# Patient Record
Sex: MALE | Race: WHITE | Employment: UNEMPLOYED | URBAN - METROPOLITAN AREA
[De-identification: names, ages, dates, MRNs, and addresses within clinical notes are randomized per-mention and may not be internally consistent; named-entity substitution may affect disease eponyms.]

---

## 2020-11-17 ENCOUNTER — HOSPITAL ENCOUNTER (OUTPATIENT)
Dept: CT IMAGING | Age: 70
Discharge: HOME | End: 2020-11-17
Payer: MEDICARE

## 2020-11-17 LAB
GFR AFRICAN AMERICAN: >60
GFR NON-AFRICAN AMERICAN: >60
PERFORMED ON: NORMAL
POC CREATININE: 1.1 MG/DL (ref 0.8–1.3)
POC SAMPLE TYPE: NORMAL

## 2020-11-17 PROCEDURE — 6360000004 HC RX CONTRAST MEDICATION: Performed by: NURSE PRACTITIONER

## 2020-11-17 PROCEDURE — 74178 CT ABD&PLV WO CNTR FLWD CNTR: CPT

## 2020-11-17 PROCEDURE — 82565 ASSAY OF CREATININE: CPT

## 2020-11-17 RX ADMIN — IOPAMIDOL 75 ML: 755 INJECTION, SOLUTION INTRAVENOUS at 08:33

## 2021-04-18 ENCOUNTER — HOSPITAL ENCOUNTER (EMERGENCY)
Age: 71
Discharge: HOME | End: 2021-04-18
Attending: EMERGENCY MEDICINE
Payer: MEDICARE

## 2021-04-18 ENCOUNTER — APPOINTMENT (OUTPATIENT)
Dept: CT IMAGING | Age: 71
End: 2021-04-18
Payer: MEDICARE

## 2021-04-18 VITALS
SYSTOLIC BLOOD PRESSURE: 112 MMHG | DIASTOLIC BLOOD PRESSURE: 76 MMHG | HEART RATE: 97 BPM | TEMPERATURE: 98.5 F | HEIGHT: 67 IN | RESPIRATION RATE: 19 BRPM | OXYGEN SATURATION: 100 %

## 2021-04-18 DIAGNOSIS — K61.1 PERIRECTAL ABSCESS: Primary | ICD-10-CM

## 2021-04-18 DIAGNOSIS — Z87.19 HISTORY OF CROHN'S DISEASE: ICD-10-CM

## 2021-04-18 LAB
ANION GAP SERPL CALCULATED.3IONS-SCNC: 14 MMOL/L (ref 3–16)
BASOPHILS ABSOLUTE: 0.1 K/UL (ref 0–0.2)
BASOPHILS RELATIVE PERCENT: 0.3 %
BUN BLDV-MCNC: 14 MG/DL (ref 7–20)
CALCIUM SERPL-MCNC: 9.6 MG/DL (ref 8.3–10.6)
CHLORIDE BLD-SCNC: 103 MMOL/L (ref 99–110)
CO2: 24 MMOL/L (ref 21–32)
CREAT SERPL-MCNC: 1.2 MG/DL (ref 0.8–1.3)
EOSINOPHILS ABSOLUTE: 0.1 K/UL (ref 0–0.6)
EOSINOPHILS RELATIVE PERCENT: 0.5 %
GFR AFRICAN AMERICAN: >60
GFR NON-AFRICAN AMERICAN: 60
GLUCOSE BLD-MCNC: 173 MG/DL (ref 70–99)
HCT VFR BLD CALC: 46.4 % (ref 40.5–52.5)
HEMOGLOBIN: 15.6 G/DL (ref 13.5–17.5)
INR BLD: 1.09 (ref 0.86–1.14)
LYMPHOCYTES ABSOLUTE: 1.2 K/UL (ref 1–5.1)
LYMPHOCYTES RELATIVE PERCENT: 6.5 %
MCH RBC QN AUTO: 29.7 PG (ref 26–34)
MCHC RBC AUTO-ENTMCNC: 33.7 G/DL (ref 31–36)
MCV RBC AUTO: 88.3 FL (ref 80–100)
MONOCYTES ABSOLUTE: 1.6 K/UL (ref 0–1.3)
MONOCYTES RELATIVE PERCENT: 8.8 %
NEUTROPHILS ABSOLUTE: 15 K/UL (ref 1.7–7.7)
NEUTROPHILS RELATIVE PERCENT: 83.9 %
PDW BLD-RTO: 15.5 % (ref 12.4–15.4)
PLATELET # BLD: 458 K/UL (ref 135–450)
PMV BLD AUTO: 8.4 FL (ref 5–10.5)
POTASSIUM REFLEX MAGNESIUM: 4.2 MMOL/L (ref 3.5–5.1)
PROTHROMBIN TIME: 12.7 SEC (ref 10–13.2)
RBC # BLD: 5.26 M/UL (ref 4.2–5.9)
SODIUM BLD-SCNC: 141 MMOL/L (ref 136–145)
WBC # BLD: 17.9 K/UL (ref 4–11)

## 2021-04-18 PROCEDURE — 74177 CT ABD & PELVIS W/CONTRAST: CPT

## 2021-04-18 PROCEDURE — 6360000002 HC RX W HCPCS: Performed by: EMERGENCY MEDICINE

## 2021-04-18 PROCEDURE — 36415 COLL VENOUS BLD VENIPUNCTURE: CPT

## 2021-04-18 PROCEDURE — 6360000004 HC RX CONTRAST MEDICATION: Performed by: EMERGENCY MEDICINE

## 2021-04-18 PROCEDURE — 2500000003 HC RX 250 WO HCPCS: Performed by: EMERGENCY MEDICINE

## 2021-04-18 PROCEDURE — 99284 EMERGENCY DEPT VISIT MOD MDM: CPT

## 2021-04-18 PROCEDURE — 10061 I&D ABSCESS COMP/MULTIPLE: CPT

## 2021-04-18 PROCEDURE — 96374 THER/PROPH/DIAG INJ IV PUSH: CPT

## 2021-04-18 PROCEDURE — 6370000000 HC RX 637 (ALT 250 FOR IP): Performed by: EMERGENCY MEDICINE

## 2021-04-18 PROCEDURE — 85610 PROTHROMBIN TIME: CPT

## 2021-04-18 PROCEDURE — 80048 BASIC METABOLIC PNL TOTAL CA: CPT

## 2021-04-18 PROCEDURE — 85025 COMPLETE CBC W/AUTO DIFF WBC: CPT

## 2021-04-18 RX ORDER — LIDOCAINE HYDROCHLORIDE AND EPINEPHRINE 10; 10 MG/ML; UG/ML
20 INJECTION, SOLUTION INFILTRATION; PERINEURAL ONCE
Status: COMPLETED | OUTPATIENT
Start: 2021-04-18 | End: 2021-04-18

## 2021-04-18 RX ORDER — ACETAMINOPHEN 325 MG/1
650 TABLET ORAL ONCE
Status: COMPLETED | OUTPATIENT
Start: 2021-04-18 | End: 2021-04-18

## 2021-04-18 RX ORDER — AMOXICILLIN AND CLAVULANATE POTASSIUM 875; 125 MG/1; MG/1
1 TABLET, FILM COATED ORAL EVERY 12 HOURS SCHEDULED
Status: DISCONTINUED | OUTPATIENT
Start: 2021-04-18 | End: 2021-04-18

## 2021-04-18 RX ORDER — AMOXICILLIN AND CLAVULANATE POTASSIUM 875; 125 MG/1; MG/1
1 TABLET, FILM COATED ORAL 2 TIMES DAILY
Qty: 20 TABLET | Refills: 0 | Status: SHIPPED | OUTPATIENT
Start: 2021-04-18 | End: 2021-04-28

## 2021-04-18 RX ORDER — AMOXICILLIN AND CLAVULANATE POTASSIUM 875; 125 MG/1; MG/1
1 TABLET, FILM COATED ORAL ONCE
Status: COMPLETED | OUTPATIENT
Start: 2021-04-18 | End: 2021-04-18

## 2021-04-18 RX ORDER — CHOLECALCIFEROL (VITAMIN D3) 125 MCG
5 CAPSULE ORAL NIGHTLY PRN
Qty: 30 TABLET | Refills: 0 | Status: SHIPPED | OUTPATIENT
Start: 2021-04-18 | End: 2021-08-25

## 2021-04-18 RX ORDER — OXYCODONE HYDROCHLORIDE 5 MG/1
5 TABLET ORAL EVERY 6 HOURS PRN
Qty: 10 TABLET | Refills: 0 | Status: SHIPPED | OUTPATIENT
Start: 2021-04-18 | End: 2021-04-21

## 2021-04-18 RX ORDER — KETOROLAC TROMETHAMINE 30 MG/ML
15 INJECTION, SOLUTION INTRAMUSCULAR; INTRAVENOUS ONCE
Status: COMPLETED | OUTPATIENT
Start: 2021-04-18 | End: 2021-04-18

## 2021-04-18 RX ADMIN — LIDOCAINE HYDROCHLORIDE,EPINEPHRINE BITARTRATE 20 ML: 10; .01 INJECTION, SOLUTION INFILTRATION; PERINEURAL at 14:59

## 2021-04-18 RX ADMIN — AMOXICILLIN AND CLAVULANATE POTASSIUM 1 TABLET: 875; 125 TABLET, FILM COATED ORAL at 14:59

## 2021-04-18 RX ADMIN — ACETAMINOPHEN 650 MG: 325 TABLET ORAL at 10:54

## 2021-04-18 RX ADMIN — KETOROLAC TROMETHAMINE 15 MG: 30 INJECTION, SOLUTION INTRAMUSCULAR at 10:54

## 2021-04-18 RX ADMIN — IOPAMIDOL 75 ML: 755 INJECTION, SOLUTION INTRAVENOUS at 11:12

## 2021-04-18 ASSESSMENT — PAIN SCALES - GENERAL
PAINLEVEL_OUTOF10: 6
PAINLEVEL_OUTOF10: 4
PAINLEVEL_OUTOF10: 0
PAINLEVEL_OUTOF10: 6

## 2021-04-18 ASSESSMENT — ENCOUNTER SYMPTOMS
EYE PAIN: 0
COUGH: 0
ROS SKIN COMMENTS: RECTAL ABSCESS
CONSTIPATION: 0

## 2021-04-18 NOTE — ED PROVIDER NOTES
[address] [facility]      Pt Name: Devan Wagner  MRN: [id number]  [place] 1950  Date of evaluation: 4/18/2021  Provider: Roberto Hines MD    CHIEF COMPLAINT       Chief Complaint   Patient presents with    Swelling     between testicles and rectum; started Wednesday and has gotten worse, denies drainage      HISTORY OF PRESENT ILLNESS  (Location/Symptom, Timing/Onset,Context/Setting, Quality, Duration, Modifying Factors, Severity). Note limiting factors. Devan Wagner is a 72 y.o. male who presents to the emergency department secondary to concern for perirectal swelling. He reports that started on Wednesday and has continued to get worse. No drainage from the area. Denies any nausea, vomiting, fevers, chills, abdominal pain. No trouble with urination or bowel movements. He does have a history of Crohn's though states he is not taking any medication for it. Past medical history noted below, significant for stroke, anemia, Crohn's, esophagitis, gastritis, rectal cancer which he states was precancerous, thrombocytosis, TIAs. Does not smoke. Aside from what is stated above denies any other symptoms or modifying factors. Nursing Notes reviewed. REVIEW OF SYSTEMS  (2-9 systems for level 4, 10 or more for level 5)   Review of Systems   Constitutional: Negative for appetite change. HENT: Negative for congestion. Eyes: Negative for pain. Respiratory: Negative for cough. Cardiovascular: Negative for leg swelling. Gastrointestinal: Negative for constipation. Genitourinary: Negative for difficulty urinating. Musculoskeletal: Positive for gait problem (mild due to pain at the site of abscess). Skin:        Rectal abscess   Neurological: Negative for dizziness.        PAST MEDICAL HISTORY     Past Medical History:   Diagnosis Date    [facility]     Anemia     [facility]hn's ileitis ([facility] 2016    Esophagitis  Gastritis     Rectal cancer ([facility] 2017    Thrombocytosis ([facility]     TIA (transient ischemic attack)        SURGICALHISTORY     History reviewed. No pertinent surgical history. CURRENT MEDICATIONS       Discharge Medication List as of 4/18/2021  3:10 PM         ALLERGIES     Patient has no known allergies. FAMILY HISTORY     History reviewed. No pertinent family history. SOCIAL HISTORY       Social History     Socioeconomic History    Marital status:      Spouse name: None    Number of children: None    Years of education: None    Highest education level: None   Occupational History    None   Social Needs    Financial resource strain: None    Food insecurity     Worry: None     Inability: None    Transportation needs     Medical: None     Non-medical: None   Tobacco Use    Smoking status: Never Smoker   Substance and Sexual Activity    Alcohol use: Not Currently    Drug use: Yes     Types: Marijuana    Sexual activity: Yes     Partners: Female   Lifestyle    Physical activity     Days per week: None     Minutes per session: None    Stress: None   Relationships    Social connections     Talks on phone: None     Gets together: None     Attends Mu-ism service: None     Active member of club or organization: None     Attends meetings of clubs or organizations: None     Relationship status: None    Intimate partner violence     Fear of current or ex partner: None     Emotionally abused: None     Physically abused: None     Forced sexual activity: None   Other Topics Concern    None   Social History Narrative    None     SCREENINGS         PHYSICAL EXAM  (up to 7 for level 4, 8 or more for level 5)   INITIAL VITALS: BP: 128/88, Temp: 98.6 °F (37 °C), Pulse: 119, Resp: 17, SpO2: 99 %   Physical Exam  Vitals signs reviewed. Exam conducted with a chaperone present. Constitutional:       General: He is not in acute distress. Appearance: He is normal weight. He is not toxic-appearing. HENT:      Head: Normocephalic and atraumatic. Right Ear: External ear normal.      Left Ear: External ear normal.      Nose: Nose normal.   Eyes:      General:         Right eye: No discharge. Left eye: No discharge. Extraocular Movements: Extraocular movements intact. Conjunctiva/sclera: Conjunctivae normal.      Pupils: Pupils are equal, round, and reactive to light. Neck:      Musculoskeletal: Normal range of motion. Trachea: No tracheal deviation. Cardiovascular:      Rate and Rhythm: Tachycardia present. Pulmonary:      Effort: Pulmonary effort is normal. No respiratory distress. Abdominal:      Tenderness: There is no abdominal tenderness. There is no right CVA tenderness, left CVA tenderness or guarding. Genitourinary:     Rectum: Tenderness (very very mild) present. Normal anal tone. Musculoskeletal:      Right lower leg: No edema. Left lower leg: No edema. Skin:     General: Skin is warm and dry. Capillary Refill: Capillary refill takes less than 2 seconds. Neurological:      General: No focal deficit present. Mental Status: He is alert and oriented to person, place, and time. Psychiatric:         Mood and Affect: Mood normal.         Behavior: Behavior normal.       DIAGNOSTIC RESULTS     RADIOLOGY:   Interpretation per Radiologist below, if available at the time of this note:  CT ABDOMEN PELVIS W IV CONTRAST Additional Contrast? None   Final Result   1. 5.1 cm x 3.6 cm x 4.5 cm subcutaneous abscess in the medial right buttock   with adjacent cellulitis. The abscess extends to the inferior margin of the   anus with evaluation for an associated fistula or sinus tract limited by   technique. Consider further evaluation with MRI if that is of clinical   concern. 2. Findings of active Crohn disease in the distal terminal ileum. 3. Liquid stool in the right hemicolon suggestive of diarrhea, likely related   to the active Crohn disease.    4. Heterogeneous rectal mucosal and/or wall enhancement could be due to   active Crohn disease but could also be seen with other costal proctitis or   malignancy. Consider endoscopy. 5. Possible cystitis with the appearance potentially due to incomplete   distention. ED BEDSIDE ULTRASOUND:   Performed by ED Physician - Dr. Kavita Stanford:  A limited, bedside ultrasound of the abscessed area was performed. The medical necessity was to evaluate for a fluid collection amenable to drainage. FINDINGS:  Fluid collection was identified and area was anesthetized with lido/epi. The study was technically adequate    LABS:  Labs Reviewed   CBC WITH AUTO DIFFERENTIAL - Abnormal; Notable for the following components:       Result Value    WBC 17.9 (*)     RDW 15.5 (*)     Platelets 412 (*)     Neutrophils Absolute 15.0 (*)     Monocytes Absolute 1.6 (*)     All other components within normal limits    Narrative:     Performed at:  [address] [employer] [facility]   Phone [phone]   BASIC METABOLIC PANEL W/ REFLEX TO MG FOR LOW K - Abnormal; Notable for the following components:    Glucose 173 (*)     GFR Non- 60 (*)     All other components within normal limits    Narrative:     Performed at:  [address] [facility] [employer] [facility]   Phone [phone]   PROTIME-INR    Narrative:     Performed at:  [address] [place] [address] Laboratory  [address] [place] [address] [employer] [facility]   Phone [phone]       All other labs were within normal range or not returned as of this dictation.     EMERGENCY DEPARTMENT COURSE and DIFFERENTIAL DIAGNOSIS/MDM:   Patient was given the following medications:  Orders Placed This Encounter   Medications    acetaminophen (TYLENOL) tablet 650 mg    ketorolac (TORADOL) injection 15 mg    iopamidol (ISOVUE-370) 76 % injection 75 mL    placed, labs were ordered along with Tylenol, Toradol. Labs show an elevated white count 17.9, renal panel unremarkable. CT scan shows an abscess of the medial right buttock with adjacent cellulitis extending towards the inferior margin of the anus as well as findings consistent with Crohn's disease. Discussed with surgery who was in agreement to attempt bedside I&D, start antibiotics, and following up with them as well as with GI. Patient is amenable to this plan. The area was studied under ultrasound, anesthetized, and drained as noted in procedure note below. He tolerated the procedure well and repeat vitals with improved heart rate noted. He was given his first dose of antibiotics, Augmentin, and this along with melatonin and a short course of oxycodone was prescribed to the pharmacy. Discussed at length with both patient and his wife importance of following up with GI as well as the surgeon. They both expressed understanding of these instructions. He states he had not gone back to the other GI physician he had seen previously as he had wanted him to be on medication which he read and the side effects increases risk of lymphoma. We discussed how chronic inflammation from Crohn's disease can also increase risk of cancer. He also currently does not have a primary care as the one he had been seeing retired, he was given a referral.    Prior to discharge reiterated importance of follow-up as well as return precautions which both he and his wife expressed understanding of. PROCEDURES:  Incision/Drainage    Date/Time: 4/18/2021 3:00 PM  Performed by: Mark Keller MD  Authorized by: Mark Keller MD     Consent:     Consent obtained:  Verbal    Consent given by:  Patient    Risks discussed:  Bleeding, incomplete drainage, pain, damage to other organs and infection  Location:     Type:  Abscess    Size:  5x3    Location:  Anogenital    Anogenital location: Medial right buttock.   Anesthesia (see completed with a voice recognition program. Efforts were made to edit the dictations but occasionally words are mis-transcribed.)    Ovidio Rivera MD (electronically signed)  Attending Emergency Physician      Ovidio Rivera MD  04/18/21 6421

## 2021-04-29 ENCOUNTER — OFFICE VISIT (OUTPATIENT)
Dept: SURGERY | Age: 71
End: 2021-04-29
Payer: MEDICARE

## 2021-04-29 VITALS
SYSTOLIC BLOOD PRESSURE: 125 MMHG | HEART RATE: 84 BPM | BODY MASS INDEX: 19.58 KG/M2 | DIASTOLIC BLOOD PRESSURE: 78 MMHG | WEIGHT: 125 LBS

## 2021-04-29 DIAGNOSIS — K61.1 PERIRECTAL ABSCESS: ICD-10-CM

## 2021-04-29 DIAGNOSIS — K50.813 CROHN'S DISEASE OF BOTH SMALL AND LARGE INTESTINE WITH FISTULA (HCC): ICD-10-CM

## 2021-04-29 DIAGNOSIS — K60.4 PERIRECTAL FISTULA: Primary | ICD-10-CM

## 2021-04-29 PROCEDURE — 3017F COLORECTAL CA SCREEN DOC REV: CPT | Performed by: SURGERY

## 2021-04-29 PROCEDURE — G8427 DOCREV CUR MEDS BY ELIG CLIN: HCPCS | Performed by: SURGERY

## 2021-04-29 PROCEDURE — 1036F TOBACCO NON-USER: CPT | Performed by: SURGERY

## 2021-04-29 PROCEDURE — 1123F ACP DISCUSS/DSCN MKR DOCD: CPT | Performed by: SURGERY

## 2021-04-29 PROCEDURE — 99203 OFFICE O/P NEW LOW 30 MIN: CPT | Performed by: SURGERY

## 2021-04-29 PROCEDURE — 4040F PNEUMOC VAC/ADMIN/RCVD: CPT | Performed by: SURGERY

## 2021-04-29 PROCEDURE — G8420 CALC BMI NORM PARAMETERS: HCPCS | Performed by: SURGERY

## 2021-04-29 RX ORDER — AMOXICILLIN AND CLAVULANATE POTASSIUM 875; 125 MG/1; MG/1
1 TABLET, FILM COATED ORAL 2 TIMES DAILY
Qty: 28 TABLET | Refills: 0 | Status: SHIPPED | OUTPATIENT
Start: 2021-04-29 | End: 2021-05-13

## 2021-04-29 NOTE — Clinical Note
Latisha Smith,    I just saw . Devan Wagner in the office for his perirectal abscess. He has a history of Crohn's disease but has not been on medication since it was diagnosed 5 years ago. CT imaging showed not only his perirectal abscess but also inflammation of his TI. On exam, his perirectal abscess/fistula appears to be healing. I reinforced the importance of following up with you and starting immunosuppressive therapy for his Crohn's. He is going to follow up with me as needed. Thank you taking care of Mr. Alexy De Jesus with me.     Ilana Lemus

## 2021-04-29 NOTE — PROGRESS NOTES
New [facility] [address] [facility]   Sharron Parada MD    [address]  [place]  Phone: [phone]  Fax: [phone]    Lorenza Riley   YOB: 1950    Date of Visit:  4/29/2021    No ref. provider found  No primary care provider on file. HPI:   Abscess: Lorenza Riley presents for evaluation of a perirectal abscess as a follow up from the emergency department. Lesion is located in the right buttock. Since drainage in the ED, it has significantly improved. It is minimally tender, and is still draining. No fevers or chills. He has a history of Crohn's disease that was diagnosed 5 years ago. He never started any immunosuppression for it. He denies any abdominal pain, nausea, vomiting, or diarrhea. He denies any prior perianal/perirectal issues. His last colonoscopy was 5 years ago with his last diagnosis. Pain Score:   1    No Known Allergies  Outpatient Medications Marked as Taking for the 4/29/21 encounter (Office Visit) with José Griffith MD   Medication Sig Dispense Refill    amoxicillin-clavulanate (AUGMENTIN) 875-125 MG per tablet Take 1 tablet by mouth 2 times daily for 14 days 28 tablet 0       Past Medical History:   Diagnosis Date    Acute embolic stroke ([address]     Anemia     Crohn's ileitis ([address] 2016    Esophagitis     Gastritis     Rectal cancer ([address] 2017    Thrombocytosis ([address]     TIA (transient ischemic attack)      History reviewed. No pertinent surgical history. History reviewed. No pertinent family history.   Social History     Socioeconomic History    Marital status:      Spouse name: Not on file    Number of children: Not on file    Years of education: Not on file    Highest education level: Not on file   Occupational History    Not on file   Social Needs    Financial resource strain: Not on file    Food insecurity     Worry: Not on file purulent drainage, minimal surrounding erythema and induration, no fluctuance    CT abd/pelvis from 4/18 personally reviewed, showing:  Impression   1. 5.1 cm x 3.6 cm x 4.5 cm subcutaneous abscess in the medial right buttock   with adjacent cellulitis.  The abscess extends to the inferior margin of the   anus with evaluation for an associated fistula or sinus tract limited by   technique.  Consider further evaluation with MRI if that is of clinical   concern. 2. Findings of active Crohn disease in the distal terminal ileum. 3. Liquid stool in the right hemicolon suggestive of diarrhea, likely related   to the active Crohn disease. 4. Heterogeneous rectal mucosal and/or wall enhancement could be due to   active Crohn disease but could also be seen with other costal proctitis or   malignancy.  Consider endoscopy. 5. Possible cystitis with the appearance potentially due to incomplete   distention. ASSESSMENT:     Diagnosis Orders   1. Perirectal fistula  amoxicillin-clavulanate (AUGMENTIN) 875-125 MG per tablet   2. Perirectal abscess  amoxicillin-clavulanate (AUGMENTIN) 875-125 MG per tablet   3. Crohn's disease of both small and large intestine with fistula (HCC)  amoxicillin-clavulanate (AUGMENTIN) 875-125 MG per tablet         PLAN:    Mr. Shelly Honeycutt has a healing right perirectal abscess with fistula, along with active inflammation of his TI on CT imaging. His abscess appears to be draining adequately. I wrote for an additional 2 weeks of augmentin, but discussed the real need to get on medication for his crohn's disease. He is going to see Dr. Soheila Ho of GI on 5/19. He is going to follow up with me as needed.     Electronically signed by Nae Barragan MD on 4/29/2021 at 11:02 AM

## 2021-05-18 LAB
A/G RATIO: 1.6 (ref 1.1–2.2)
ALBUMIN SERPL-MCNC: 4.4 G/DL (ref 3.4–5)
ALP BLD-CCNC: 83 U/L (ref 40–129)
ALT SERPL-CCNC: 18 U/L (ref 10–40)
ANION GAP SERPL CALCULATED.3IONS-SCNC: 15 MMOL/L (ref 3–16)
AST SERPL-CCNC: 19 U/L (ref 15–37)
BASOPHILS ABSOLUTE: 0 K/UL (ref 0–0.2)
BASOPHILS RELATIVE PERCENT: 0.4 %
BILIRUB SERPL-MCNC: 0.3 MG/DL (ref 0–1)
BUN BLDV-MCNC: 10 MG/DL (ref 7–20)
CALCIUM SERPL-MCNC: 9.8 MG/DL (ref 8.3–10.6)
CHLORIDE BLD-SCNC: 104 MMOL/L (ref 99–110)
CO2: 26 MMOL/L (ref 21–32)
CREAT SERPL-MCNC: 1 MG/DL (ref 0.8–1.3)
EOSINOPHILS ABSOLUTE: 0.3 K/UL (ref 0–0.6)
EOSINOPHILS RELATIVE PERCENT: 2.8 %
FERRITIN: 75.5 NG/ML (ref 30–400)
FOLATE: >20 NG/ML (ref 4.78–24.2)
GFR AFRICAN AMERICAN: >60
GFR NON-AFRICAN AMERICAN: >60
GLOBULIN: 2.8 G/DL
GLUCOSE BLD-MCNC: 112 MG/DL (ref 70–99)
HBV SURFACE AB TITR SER: <3.5 MIU/ML
HCT VFR BLD CALC: 44.5 % (ref 40.5–52.5)
HEMOGLOBIN: 15 G/DL (ref 13.5–17.5)
HEPATITIS B SURFACE ANTIGEN INTERPRETATION: NORMAL
IRON SATURATION: 11 % (ref 20–50)
IRON: 42 UG/DL (ref 59–158)
LYMPHOCYTES ABSOLUTE: 1.4 K/UL (ref 1–5.1)
LYMPHOCYTES RELATIVE PERCENT: 15.4 %
MCH RBC QN AUTO: 29.5 PG (ref 26–34)
MCHC RBC AUTO-ENTMCNC: 33.6 G/DL (ref 31–36)
MCV RBC AUTO: 87.7 FL (ref 80–100)
MONOCYTES ABSOLUTE: 0.8 K/UL (ref 0–1.3)
MONOCYTES RELATIVE PERCENT: 8.3 %
NEUTROPHILS ABSOLUTE: 6.6 K/UL (ref 1.7–7.7)
NEUTROPHILS RELATIVE PERCENT: 73.1 %
PDW BLD-RTO: 15.2 % (ref 12.4–15.4)
PLATELET # BLD: 387 K/UL (ref 135–450)
PMV BLD AUTO: 8.3 FL (ref 5–10.5)
POTASSIUM SERPL-SCNC: 4.7 MMOL/L (ref 3.5–5.1)
RBC # BLD: 5.08 M/UL (ref 4.2–5.9)
SODIUM BLD-SCNC: 145 MMOL/L (ref 136–145)
TOTAL IRON BINDING CAPACITY: 390 UG/DL (ref 260–445)
TOTAL PROTEIN: 7.2 G/DL (ref 6.4–8.2)
TRANSFERRIN: 329 MG/DL (ref 200–360)
VITAMIN B-12: 547 PG/ML (ref 211–911)
WBC # BLD: 9 K/UL (ref 4–11)

## 2021-05-21 LAB
QUANTI TB GOLD PLUS: NEGATIVE
QUANTI TB1 MINUS NIL: 0 IU/ML (ref 0–0.34)
QUANTI TB2 MINUS NIL: 0 IU/ML (ref 0–0.34)
QUANTIFERON MITOGEN: >10 IU/ML
QUANTIFERON NIL: 0.04 IU/ML

## 2021-08-25 ENCOUNTER — HOSPITAL ENCOUNTER (EMERGENCY)
Age: 71
Discharge: HOME | End: 2021-08-25
Attending: EMERGENCY MEDICINE
Payer: MEDICARE

## 2021-08-25 VITALS
WEIGHT: 128.31 LBS | DIASTOLIC BLOOD PRESSURE: 79 MMHG | HEIGHT: 70 IN | RESPIRATION RATE: 17 BRPM | OXYGEN SATURATION: 100 % | TEMPERATURE: 97.3 F | BODY MASS INDEX: 18.37 KG/M2 | HEART RATE: 64 BPM | SYSTOLIC BLOOD PRESSURE: 141 MMHG

## 2021-08-25 DIAGNOSIS — R22.0 SWELLING OF BOTH LIPS: Primary | ICD-10-CM

## 2021-08-25 PROCEDURE — 99284 EMERGENCY DEPT VISIT MOD MDM: CPT

## 2021-08-25 PROCEDURE — 6370000000 HC RX 637 (ALT 250 FOR IP): Performed by: NURSE PRACTITIONER

## 2021-08-25 RX ORDER — PREDNISONE 10 MG/1
60 TABLET ORAL DAILY
Qty: 30 TABLET | Refills: 0 | Status: SHIPPED | OUTPATIENT
Start: 2021-08-25 | End: 2021-08-30

## 2021-08-25 RX ORDER — DIPHENHYDRAMINE HCL 25 MG
25 TABLET ORAL
Status: COMPLETED | OUTPATIENT
Start: 2021-08-25 | End: 2021-08-25

## 2021-08-25 RX ORDER — DIPHENHYDRAMINE HCL 25 MG
25 CAPSULE ORAL EVERY 6 HOURS PRN
Qty: 20 CAPSULE | Refills: 0 | Status: SHIPPED | OUTPATIENT
Start: 2021-08-25 | End: 2021-08-30

## 2021-08-25 RX ORDER — INFLIXIMAB-DYYB 100 MG/10ML
INJECTION, POWDER, LYOPHILIZED, FOR SOLUTION INTRAVENOUS
COMMUNITY

## 2021-08-25 RX ORDER — FAMOTIDINE 20 MG/1
20 TABLET, FILM COATED ORAL ONCE
Status: COMPLETED | OUTPATIENT
Start: 2021-08-25 | End: 2021-08-25

## 2021-08-25 RX ORDER — FAMOTIDINE 20 MG/1
20 TABLET, FILM COATED ORAL 2 TIMES DAILY
Qty: 10 TABLET | Refills: 0 | Status: SHIPPED | OUTPATIENT
Start: 2021-08-25 | End: 2021-08-30

## 2021-08-25 RX ORDER — PREDNISONE 20 MG/1
60 TABLET ORAL ONCE
Status: COMPLETED | OUTPATIENT
Start: 2021-08-25 | End: 2021-08-25

## 2021-08-25 RX ADMIN — DIPHENHYDRAMINE HCL 25 MG: 25 TABLET ORAL at 11:02

## 2021-08-25 RX ADMIN — PREDNISONE 60 MG: 20 TABLET ORAL at 11:02

## 2021-08-25 RX ADMIN — FAMOTIDINE 20 MG: 20 TABLET, FILM COATED ORAL at 11:02

## 2021-08-25 ASSESSMENT — PAIN SCALES - GENERAL: PAINLEVEL_OUTOF10: 0

## 2021-08-25 NOTE — ED PROVIDER NOTES
Attending Supervisory Note/Shared Visit   I have personally performed a face to face diagnostic evaluation on this patient. I have reviewed the mid-levels findings and agree. History and Exam by me shows alert white male no acute distress. He presents complaining of some swelling in his lip since yesterday. No difficulty swallowing or breathing. No shortness of breath. No rash or itching. No new exposures. He is not on an ACE inhibitor. He states he called his primary care physician who sent him in. HEENT: Conjunctiva are clear. Nose is clear. He has some minimal symmetrical edema of the lower lip. I do not appreciate any edema of the upper lip. There is no edema of the uvula, soft palate, or tongue. Neck: Supple without adenopathy. Heart: Regular rate and rhythm. No murmurs gallops noted. Lungs: Breath sounds equal bilaterally and clear. Skin: No rash. Patient was given prednisone, Benadryl, and Pepcid. Clinically he may have some mild angioedema. Its not painful. It is not tender. It does not look infected. He will be treated as angioedema and discharge.       (Please note that portions of this note were completed with a voice recognition program.  Efforts were made to edit the dictations but occasionally words are mis-transcribed.)    Geoffrey Harris MD  Attending Emergency Physician        Bebeto Samuel MD  08/25/21 5341

## 2021-08-25 NOTE — ED PROVIDER NOTES
[address]  [address] [address]  Dept: [phone]  Loc: [address] [place] [address] COMPLAINT    Chief Complaint   Patient presents with    Oral Swelling     having swelling of the lips. Had a sore throat and now with swelling to the lips. Talked to his MD this AM and they seggested he come get eval       HPI    Amilcar Rehman is a 72 y.o. male who presents to the emergency department with concerns that his lips started swelling yesterday. He woke up today and they continue to be swollen. He denies tongue swelling, problems swallowing or sore throat pain. He had a sore throat about a week ago. He denies shortness of breath, body aches, fever, chills. He denies difficulty in speaking. He has been eating and drinking just fine. He denies unusual skin rash to anyplace else on his body. He has never had this before. He denies taking ACE inhibitors. He states he called his PCPs office who directed him to come to the emergency department for evaluation. REVIEW OF SYSTEMS    Cardiac:  No syncope  Respiratory: \no shortness of breath  ENT: \no tongue or throat swelling   GI: No Vomiting  General: No Fever  Skin: see HPI  \All other systems reviewed and are negative. PAST MEDICAL & SURGICAL HISTORY    Past Medical History:   Diagnosis Date    Acute embolic stroke ([address]     Anemia     Crohn's ileitis ([address] 2016    Esophagitis     Gastritis     [facility]al [facility] 2017    Thrombocytosis ([facility]     TIA (transient ischemic attack)      History reviewed. No pertinent surgical history.     CURRENT MEDICATIONS  (may include discharge medications prescribed in the ED)  Current Outpatient Rx   Medication Sig Dispense Refill    inFLIXimab (INFLECTRA) 100 MG SOLR Infuse intravenously EVERY 8 WEEKS      famotidine (PEPCID) 20 MG tablet Take 1 tablet by mouth 2 times daily for 5 days 10 tablet 0    predniSONE (DELTASONE) 10 MG tablet Take 6 tablets by mouth daily for 5 doses 30 tablet 0    diphenhydrAMINE (BENADRYL) 25 MG capsule Take 1 capsule by mouth every 6 hours as needed (lip swelling) 20 capsule 0       ALLERGIES    No Known Allergies    SOCIAL & FAMILY HISTORY    Social History     Socioeconomic History    Marital status:      Spouse name: None    Number of children: None    Years of education: None    Highest education level: None   Occupational History    None   Tobacco Use    Smoking status: Former Smoker    Smokeless tobacco: Never Used   Substance and Sexual Activity    Alcohol use: Not Currently    Drug use: Yes     Types: Marijuana    Sexual activity: Yes     Partners: Female   Other Topics Concern    None   Social History Narrative    None     Social Determinants of Health     Financial Resource Strain:     Difficulty of Paying Living Expenses:    Food Insecurity:     Worried About Running Out of Food in the Last Year:     Ran Out of Food in the Last Year:    Transportation Needs:     Lack of Transportation (Medical):  Lack of Transportation (Non-Medical):    Physical Activity:     Days of Exercise per Week:     Minutes of Exercise per Session:    Stress:     Feeling of Stress :    Social Connections:     Frequency of Communication with Friends and Family:     Frequency of Social Gatherings with Friends and Family:     Attends Restorationist Services:     Active Member of Clubs or Organizations:     Attends Club or Organization Meetings:     Marital Status:    Intimate Partner Violence:     Fear of Current or Ex-Partner:     Emotionally Abused:     Physically Abused:     Sexually Abused:      History reviewed. No pertinent family history.     PHYSICAL EXAM    VITAL SIGNS: BP (!) 141/79   Pulse 64   Temp 97.3 °F (36.3 °C) (Temporal)   Resp 17   Ht 5' 10\" (1.778 m)   Wt 128 lb 4.9 oz (58.2 kg)   SpO2 100%   BMI 18.41 kg/m²   Constitutional: Well developed, well nourished, no acute distress   HEENT:  Atraumatic, minimal lower lip erythema, dry, cracked. No lip swelling. no tongue or throat swelling, no periorbital swelling, no pain with extraocular movement. No trismus. Uvula midline with a normal rise and fall. Phonation within normal limits. Swallowing secretions without difficulty. Neck: supple, no JVD, No neck swelling  Respiratory:  Lungs clear to auscultation bilaterally, no retractions   Cardiovascular:  regular rate, no murmurs  GI:  Soft, nontender, normal bowel sounds  Musculoskeletal:  No edema, no acute deformities  Integument:  Skin is warm and dry, no rash, no oozing skin lesions, no petechiae, pustules, purpura, or induration  Neurologic:  Alert & oriented, no slurred speech  Psych: Pleasant affect, patient does not appear anxious      RADIOLOGY/PROCEDURES      Labs Reviewed - No data to display    ED COURSE & MEDICAL DECISION MAKING    See chart for details of medications given during the ED stay. Vitals:    08/25/21 1024 08/25/21 1128   BP: 133/87 (!) 141/79   Pulse: 90 64   Resp: 16 17   Temp: 97.3 °F (36.3 °C)    TempSrc: Temporal    SpO2: 98% 100%   Weight: 128 lb 4.9 oz (58.2 kg)    Height: 5' 10\" (1.778 m)      Medications   predniSONE (DELTASONE) tablet 60 mg (60 mg Oral Given 8/25/21 1102)   diphenhydrAMINE (BENADRYL) tablet 25 mg (25 mg Oral Given 8/25/21 1102)   famotidine (PEPCID) tablet 20 mg (20 mg Oral Given 8/25/21 1102)     This patient was seen evaluated by myself my attending physician Dr. Zora Sin. Differential diagnosis includes was not limited to anaphylaxis, angioedema, other. Is nontoxic in appearance and hemodynamically stable. I do not appreciate any lip swelling. There is no oral pharyngeal or tongue involvement. He was given prednisone, Benadryl and Pepcid. He did not want to wait the typical 2 hours for reevaluation.   I feel that this is reasonable considering he states his symptoms started yesterday and they are so mild that I cannot tell that his lips are even swollen. I will discharge him home with prednisone Benadryl and Pepcid with instructions to return to the emergency department for worsening symptoms. Otherwise he can follow-up with his PCP. Patient's wife verbalized understanding of the discharge instructions. ately for any new or worsening symptoms. The patient verbalizes understanding. FINAL IMPRESSION    1.  Swelling of both lips        PLAN  Discharge with close outpatient follow-up (see EMR)     (Please note that this note was completed with a voice recognition program.  Every attempt was made to edit the dictations, but inevitably there remain words that are mis-transcribed.)           Clinton John, ROBERT - CNP  08/25/21 8316

## 2021-08-25 NOTE — ED NOTES
Bed: D-39  Expected date:   Expected time:   Means of arrival:   Comments:  #1      Jarred Aranda RN  08/25/21 1028

## 2021-08-25 NOTE — ED TRIAGE NOTES
Pt here with lip swelling starting yesterday. Pt admits to having sore throat last week. Denies any new foods or products.  Denies any shortness of breath lt arm/elbow injury s/p fall. [facility] board

## 2022-03-02 LAB
A/G RATIO: 1.3 (ref 1.1–2.2)
ALBUMIN SERPL-MCNC: 3.9 G/DL (ref 3.4–5)
ALP BLD-CCNC: 71 U/L (ref 40–129)
ALT SERPL-CCNC: 21 U/L (ref 10–40)
ANION GAP SERPL CALCULATED.3IONS-SCNC: 14 MMOL/L (ref 3–16)
AST SERPL-CCNC: 18 U/L (ref 15–37)
BACTERIA: ABNORMAL /HPF
BASOPHILS ABSOLUTE: 0 K/UL (ref 0–0.2)
BASOPHILS RELATIVE PERCENT: 0.2 %
BILIRUB SERPL-MCNC: 0.4 MG/DL (ref 0–1)
BILIRUBIN URINE: NEGATIVE
BLOOD, URINE: ABNORMAL
BUN BLDV-MCNC: 10 MG/DL (ref 7–20)
CALCIUM SERPL-MCNC: 9.3 MG/DL (ref 8.3–10.6)
CHLORIDE BLD-SCNC: 102 MMOL/L (ref 99–110)
CLARITY: ABNORMAL
CO2: 24 MMOL/L (ref 21–32)
COLOR: YELLOW
COMMENT UA: ABNORMAL
CREAT SERPL-MCNC: 0.9 MG/DL (ref 0.8–1.3)
EOSINOPHILS ABSOLUTE: 0.1 K/UL (ref 0–0.6)
EOSINOPHILS RELATIVE PERCENT: 1.6 %
EPITHELIAL CELLS, UA: 3 /HPF (ref 0–5)
GFR AFRICAN AMERICAN: >60
GFR NON-AFRICAN AMERICAN: >60
GLUCOSE BLD-MCNC: 148 MG/DL (ref 70–99)
GLUCOSE URINE: NEGATIVE MG/DL
HCT VFR BLD CALC: 44.6 % (ref 40.5–52.5)
HEMOGLOBIN: 15 G/DL (ref 13.5–17.5)
HYALINE CASTS: 4 /LPF (ref 0–8)
KETONES, URINE: ABNORMAL MG/DL
LEUKOCYTE ESTERASE, URINE: ABNORMAL
LYMPHOCYTES ABSOLUTE: 1.2 K/UL (ref 1–5.1)
LYMPHOCYTES RELATIVE PERCENT: 17.8 %
MCH RBC QN AUTO: 29.8 PG (ref 26–34)
MCHC RBC AUTO-ENTMCNC: 33.7 G/DL (ref 31–36)
MCV RBC AUTO: 88.3 FL (ref 80–100)
MICROSCOPIC EXAMINATION: YES
MONOCYTES ABSOLUTE: 0.7 K/UL (ref 0–1.3)
MONOCYTES RELATIVE PERCENT: 10.7 %
NEUTROPHILS ABSOLUTE: 4.5 K/UL (ref 1.7–7.7)
NEUTROPHILS RELATIVE PERCENT: 69.7 %
NITRITE, URINE: POSITIVE
PDW BLD-RTO: 14.9 % (ref 12.4–15.4)
PH UA: 6 (ref 5–8)
PLATELET # BLD: 365 K/UL (ref 135–450)
PMV BLD AUTO: 8.1 FL (ref 5–10.5)
POTASSIUM SERPL-SCNC: 4 MMOL/L (ref 3.5–5.1)
PROTEIN UA: 100 MG/DL
RBC # BLD: 5.05 M/UL (ref 4.2–5.9)
RBC UA: ABNORMAL /HPF (ref 0–4)
SODIUM BLD-SCNC: 140 MMOL/L (ref 136–145)
SPECIFIC GRAVITY UA: >=1.03 (ref 1–1.03)
TOTAL PROTEIN: 6.8 G/DL (ref 6.4–8.2)
URINE TYPE: ABNORMAL
UROBILINOGEN, URINE: 0.2 E.U./DL
WBC # BLD: 6.5 K/UL (ref 4–11)
WBC UA: >900 /HPF (ref 0–5)

## 2022-03-03 LAB — URINE CULTURE, ROUTINE: NORMAL

## 2022-06-02 LAB
C-REACTIVE PROTEIN: 18.8 MG/L (ref 0–5.1)
FERRITIN: 285.3 NG/ML (ref 30–400)
FOLATE: >20 NG/ML (ref 4.78–24.2)
HBV SURFACE AB TITR SER: <3.5 MIU/ML
HEPATITIS B SURFACE ANTIGEN INTERPRETATION: NORMAL
IRON SATURATION: 23 % (ref 20–50)
IRON: 66 UG/DL (ref 59–158)
TOTAL IRON BINDING CAPACITY: 289 UG/DL (ref 260–445)
TRANSFERRIN: 265 MG/DL (ref 200–360)
VITAMIN B-12: 913 PG/ML (ref 211–911)

## 2022-06-05 LAB
QUANTI TB GOLD PLUS: NEGATIVE
QUANTI TB1 MINUS NIL: 0 IU/ML (ref 0–0.34)
QUANTI TB2 MINUS NIL: 0 IU/ML (ref 0–0.34)
QUANTIFERON MITOGEN: 0.95 IU/ML
QUANTIFERON NIL: 0.04 IU/ML

## 2022-07-02 ENCOUNTER — HOSPITAL ENCOUNTER (EMERGENCY)
Age: 72
Discharge: HOME | End: 2022-07-02
Attending: EMERGENCY MEDICINE
Payer: MEDICARE

## 2022-07-02 VITALS
RESPIRATION RATE: 16 BRPM | DIASTOLIC BLOOD PRESSURE: 87 MMHG | HEIGHT: 70 IN | BODY MASS INDEX: 14.01 KG/M2 | OXYGEN SATURATION: 97 % | HEART RATE: 85 BPM | TEMPERATURE: 97.3 F | SYSTOLIC BLOOD PRESSURE: 137 MMHG | WEIGHT: 97.88 LBS

## 2022-07-02 DIAGNOSIS — K61.0 PERIANAL ABSCESS: Primary | ICD-10-CM

## 2022-07-02 PROCEDURE — 99283 EMERGENCY DEPT VISIT LOW MDM: CPT

## 2022-07-02 PROCEDURE — 46050 I&D PERIANAL ABSCESS SUPFC: CPT

## 2022-07-02 RX ORDER — CLINDAMYCIN HYDROCHLORIDE 300 MG/1
300 CAPSULE ORAL 3 TIMES DAILY
Qty: 30 CAPSULE | Refills: 0 | Status: SHIPPED | OUTPATIENT
Start: 2022-07-02 | End: 2022-07-12

## 2022-07-02 ASSESSMENT — PAIN SCALES - GENERAL
PAINLEVEL_OUTOF10: 0
PAINLEVEL_OUTOF10: 2

## 2022-07-02 ASSESSMENT — PAIN DESCRIPTION - LOCATION: LOCATION: PERINEUM;RECTUM

## 2022-07-02 ASSESSMENT — PAIN DESCRIPTION - PAIN TYPE: TYPE: ACUTE PAIN

## 2022-07-02 ASSESSMENT — PAIN DESCRIPTION - DESCRIPTORS: DESCRIPTORS: PATIENT UNABLE TO DESCRIBE

## 2022-07-02 NOTE — ED NOTES
Discharge and education instructions reviewed. Patient verbalized understanding, teach-back successful. Patient denied questions at this time. No acute distress noted. Patient instructed to follow-up as noted - return to emergency department if symptoms worsen. Patient verbalized understanding. Discharged per EDMD with discharge instructions.         Ed Zavala RN  07/02/22 0365

## 2022-07-02 NOTE — ED PROVIDER NOTES
[facility]  eMERGENCY dEPARTMENT eNCOUnter      Pt Name: Karuna Cantu  MRN: [id number]  [place] 1950  Date of evaluation: 7/2/2022  Provider: Jeffry So MD    CHIEF COMPLAINT       Chief Complaint   Patient presents with    Abscess         HISTORY OF PRESENT ILLNESS  (Location/Symptom, Timing/Onset, Context/Setting, Quality, Duration, Modifying Factors, Severity.)   Karuna Cantu is a 80 y.o. male who presents to the emergency department planing of an abscess in his perianal area. He first noticed it on Thursday. Its become more painful and swollen. He had a abscess in the same area in the past.  He has a history of Crohn's disease and has had recurrent abscesses. Nursing Notes were reviewed and I agree. REVIEW OF SYSTEMS    (2-9 systems for level 4, 10 or more for level 5)     Fever or chills. ENT: Negative. Cardiovascular: No chest pain. Pulmonary: No shortness of breath. GI: No abdominal pain nausea or vomiting. Skin: Abscess in the perianal region. Except as noted above the remainder of the review of systems was reviewed and negative. PAST MEDICAL HISTORY         Diagnosis Date    Acute embolic stroke ([address]     Anemia     Crohn's ileitis ([address] 2016    Esophagitis     Gastritis     Rectal cancer ([address] 2017    Thrombocytosis ([address]     TIA (transient ischemic attack)        SURGICAL HISTORY     No past surgical history on file. CURRENT MEDICATIONS       Previous Medications    FAMOTIDINE (PEPCID) 20 MG TABLET    Take 1 tablet by mouth 2 times daily for 5 days    INFLIXIMAB (INFLECTRA) 100 MG SOLR    Infuse intravenously EVERY 8 WEEKS       ALLERGIES     Patient has no known allergies. FAMILY HISTORY     No family history on file. No family status information on file. SOCIAL HISTORY      reports that he has quit smoking. He has never used smokeless tobacco. He reports previous alcohol use.  He reports current drug use. Drug: Marijuana [place]). PHYSICAL EXAM    (up to 7 for level 4, 8 or more for level 5)     ED Triage Vitals [07/02/22 0711]   BP Temp Temp Source Heart Rate Resp SpO2 Height Weight   137/87 97.3 °F (36.3 °C) Oral 85 16 97 % 5' 10\" (1.778 m) 97 lb 14.2 oz (44.4 kg)       General: Alert thin white male in no acute distress. Heart: Regular rate and rhythm. No murmurs or gallops noted. Lungs: Breath sounds equal bilaterally and clear. Abdomen: Soft, nondistended, nontender. Rectal exam: Fluctuant 2 cm abscess in the perianal region at about the 5 o'clock position. No drainage. No significant erythema. DIAGNOSTIC RESULTS     RADIOLOGY:   Non-plain film images such as CT, Ultrasound and MRI are read by the radiologist. Plain radiographic images are visualized and preliminarily interpreted by Clement Ahuja MD with the below findings:      Interpretation per the Radiologist below, if available at the time of this note:    No orders to display       LABS:  Labs Reviewed - No data to display    All other labs were within normal range or not returned as of this dictation. EMERGENCY DEPARTMENT COURSE and DIFFERENTIAL DIAGNOSIS/MDM:   Vitals:    Vitals:    07/02/22 0711   BP: 137/87   Pulse: 85   Resp: 16   Temp: 97.3 °F (36.3 °C)   TempSrc: Oral   SpO2: 97%   Weight: 97 lb 14.2 oz (44.4 kg)   Height: 5' 10\" (1.778 m)       An incision and drainage was performed. A significant amount of bloody purulent drainage was obtained. Packing was placed. He was discharged on clindamycin. Follow-up in 3 days for packing removal.  Return as needed for any further problems. Diagnosis and treatment plan were discussed the patient. He understands treatment plan follow-up as discussed.     PROCEDURES:  Incision/Drainage    Date/Time: 7/2/2022 7:44 AM  Performed by: Lakisha Scherer MD  Authorized by: Lakisha Scherer MD     Consent:     Consent obtained:  Verbal    Consent given by: Patient    Risks discussed:  Bleeding, incomplete drainage, pain, infection and damage to other organs  Location:     Type:  Abscess    Location:  Anogenital    Anogenital location:  Perianal  Pre-procedure details:     Skin preparation:  Hibiclens  Anesthesia (see MAR for exact dosages): Anesthesia method:  Local infiltration    Local anesthetic:  Lidocaine 1% w/o epi and bupivacaine 0.5% w/o epi  Procedure type:     Complexity:  Simple  Procedure details:     Incision types:  Single straight    Incision depth:  Subcutaneous    Scalpel blade:  15    Wound management:  Probed and deloculated    Drainage:  Purulent    Drainage amount: Moderate    Wound treatment:  Wound left open    Packing materials:  1/4 in iodoform gauze    Amount 1/4\" iodoform:  6  Post-procedure details:     Patient tolerance of procedure: Tolerated well, no immediate complications          FINAL IMPRESSION      1.  Perianal abscess          DISPOSITION/PLAN   DISPOSITION Decision To Discharge 07/02/2022 07:41:00 AM      PATIENT REFERRED TO:  Family MD    In 3 days  Packing Removal      DISCHARGE MEDICATIONS:  New Prescriptions    CLINDAMYCIN (CLEOCIN) 300 MG CAPSULE    Take 1 capsule by mouth 3 times daily for 10 days       (Please note that portions of this note were completed with a voice recognition program.  Efforts were made to edit the dictations but occasionally words are mis-transcribed.)    Bernadette Cook MD  Attending Emergency Physician        Jeffrey Botello MD  07/02/22 7017

## 2022-11-29 LAB
A/G RATIO: 1.4 (ref 1.1–2.2)
ALBUMIN SERPL-MCNC: 4 G/DL (ref 3.4–5)
ALP BLD-CCNC: 69 U/L (ref 40–129)
ALT SERPL-CCNC: 14 U/L (ref 10–40)
ANION GAP SERPL CALCULATED.3IONS-SCNC: 13 MMOL/L (ref 3–16)
AST SERPL-CCNC: 16 U/L (ref 15–37)
BASOPHILS ABSOLUTE: 0 K/UL (ref 0–0.2)
BASOPHILS RELATIVE PERCENT: 0.3 %
BILIRUB SERPL-MCNC: 0.3 MG/DL (ref 0–1)
BUN BLDV-MCNC: 8 MG/DL (ref 7–20)
CALCIUM SERPL-MCNC: 9.5 MG/DL (ref 8.3–10.6)
CHLORIDE BLD-SCNC: 103 MMOL/L (ref 99–110)
CO2: 25 MMOL/L (ref 21–32)
CREAT SERPL-MCNC: 1 MG/DL (ref 0.8–1.3)
EOSINOPHILS ABSOLUTE: 0.2 K/UL (ref 0–0.6)
EOSINOPHILS RELATIVE PERCENT: 1.9 %
GFR SERPL CREATININE-BSD FRML MDRD: >60 ML/MIN/{1.73_M2}
GLUCOSE BLD-MCNC: 130 MG/DL (ref 70–99)
HCT VFR BLD CALC: 44.1 % (ref 40.5–52.5)
HEMOGLOBIN: 14.4 G/DL (ref 13.5–17.5)
LYMPHOCYTES ABSOLUTE: 1.5 K/UL (ref 1–5.1)
LYMPHOCYTES RELATIVE PERCENT: 17.5 %
MCH RBC QN AUTO: 28.7 PG (ref 26–34)
MCHC RBC AUTO-ENTMCNC: 32.5 G/DL (ref 31–36)
MCV RBC AUTO: 88.2 FL (ref 80–100)
MONOCYTES ABSOLUTE: 0.9 K/UL (ref 0–1.3)
MONOCYTES RELATIVE PERCENT: 9.9 %
NEUTROPHILS ABSOLUTE: 6.2 K/UL (ref 1.7–7.7)
NEUTROPHILS RELATIVE PERCENT: 70.4 %
PDW BLD-RTO: 15.1 % (ref 12.4–15.4)
PLATELET # BLD: NORMAL K/UL (ref 135–450)
PLATELET SLIDE REVIEW: NORMAL
PMV BLD AUTO: NORMAL FL (ref 5–10.5)
POTASSIUM SERPL-SCNC: 3.8 MMOL/L (ref 3.5–5.1)
RBC # BLD: 5 M/UL (ref 4.2–5.9)
SLIDE REVIEW: NORMAL
SODIUM BLD-SCNC: 141 MMOL/L (ref 136–145)
TOTAL PROTEIN: 6.8 G/DL (ref 6.4–8.2)
WBC # BLD: 8.8 K/UL (ref 4–11)

## 2022-12-01 ENCOUNTER — TELEPHONE (OUTPATIENT)
Dept: SURGERY | Age: 72
End: 2022-12-01

## 2022-12-22 ENCOUNTER — OFFICE VISIT (OUTPATIENT)
Dept: SURGERY | Age: 72
End: 2022-12-22
Payer: MEDICARE

## 2022-12-22 VITALS
DIASTOLIC BLOOD PRESSURE: 81 MMHG | HEART RATE: 77 BPM | BODY MASS INDEX: 18.61 KG/M2 | TEMPERATURE: 97 F | SYSTOLIC BLOOD PRESSURE: 126 MMHG | WEIGHT: 130 LBS | HEIGHT: 70 IN

## 2022-12-22 DIAGNOSIS — K60.3 ANAL FISTULA: Primary | ICD-10-CM

## 2022-12-22 DIAGNOSIS — K50.113 CROHN'S DISEASE OF LARGE INTESTINE WITH FISTULA (HCC): ICD-10-CM

## 2022-12-22 PROCEDURE — 1036F TOBACCO NON-USER: CPT | Performed by: SURGERY

## 2022-12-22 PROCEDURE — 99204 OFFICE O/P NEW MOD 45 MIN: CPT | Performed by: SURGERY

## 2022-12-22 PROCEDURE — G8484 FLU IMMUNIZE NO ADMIN: HCPCS | Performed by: SURGERY

## 2022-12-22 PROCEDURE — 3017F COLORECTAL CA SCREEN DOC REV: CPT | Performed by: SURGERY

## 2022-12-22 PROCEDURE — G8427 DOCREV CUR MEDS BY ELIG CLIN: HCPCS | Performed by: SURGERY

## 2022-12-22 PROCEDURE — 1123F ACP DISCUSS/DSCN MKR DOCD: CPT | Performed by: SURGERY

## 2022-12-22 PROCEDURE — G8420 CALC BMI NORM PARAMETERS: HCPCS | Performed by: SURGERY

## 2022-12-22 NOTE — PROGRESS NOTES
[facility] [place] [facility]  [address]  [address] [address]  Dept: [phone]  Dept Fax: [phone]: [phone]    Visit Date: 12/22/2022    Eloisa Lopez is a 71 y.o. male who presents today for: New Patient (Crohn's, referred by Dr. Manish Sethi )      HPI:       Eloisa Lopez is a 71 y.o. male referred to me by Dr. Janina Mckinley of GI for further evaluation regarding Crohn's disease and possible anal fistula. Brenda Edge tells me he has had Crohn's for about 7 years or so. He is currently on Skyriva. He has had 2 previous perirectal abscesses that have required drainage in the ER. His last colonoscopy was in 2021. He denies previous abdominal surgeries. Currently he has no symptoms but states that occasionally the abscess will fill up and spontaneously drain. Patient's problem list, medications, past medical, surgical, family, and social histories were reviewed and updated in the chart as indicated today. Past Medical History:   Diagnosis Date    Acute embolic stroke ([address]     Anemia     Crohn's ileitis ([address] 2016    Esophagitis     Gastritis     Rectal cancer ([facility] 2017    Thrombocytosis     TIA (transient ischemic attack)        No past surgical history on file. Cancer-related family history is not on file. Social History:   Social History     Tobacco Use    Smoking status: Former    Smokeless tobacco: Never   Substance Use Topics    Alcohol use: Not Currently      Tobacco cessation counseling provided as appropriate. REVIEW OF SYSTEMS:    Pertinent positives and negatives are mentioned in the HPI above. Otherwise, all other systems were reviewed and negative.       Objective:     Physical Exam   /81 (Site: Left Upper Arm, Position: Sitting)   Pulse 77   Temp 97 °F (36.1 °C)   Ht 5' 10\" (1.778 m)   Wt 130 lb (59 kg)   BMI 18.65 kg/m²   Constitutional: Appears well-developed and well-nourished. Grooming appropriate. No gross deformities. Body mass index is 18.65 kg/m². Eyes: No scleral icterus. Conjunctiva/lids normal. Vision intact grossly. Pupils equal/symmetric, reactive bilaterally. ENT: External ears/nose without defect, scars, or masses. Hearing grossly intact. No facial deformity. Lips normal, normal dentition. Neck: No masses. Trachea midline. No crepitus. Thyroid not enlarged. Cardiovascular: Normal rate. No peripheral edema. Abdominal aorta normal size to palpation. Pulmonary/Chest: Effort normal. No respiratory distress. No wheezes. No use of accessory muscles. Musculoskeletal: Normal range of motion x all 4 extremities and head/neck, without deformity, pain, or crepitus, with normal strength and tone. Normal gait. Nails without clubbing or cyanosis. Neurological: Alert and oriented to person, place, and time. No gross deficits. Sensation intact. Skin: Skin is dry. No rashes noted. No pallor. No induration of nodules. Psychiatric: Normal mood and affect. Behavior normal. Oriented to person, place, and time. Judgment and insight reasonable. Abdominal/wound: Soft, nontender, nondistended    Anorectal Exam: Chaperone present in room throughout exam.  Patient placed in the left lateral position. Buttocks spread. Digital rectal exam performed with lubricated finger. Anoscopy performed. Findings reveal: Evidence of old perirectal abscess in the left anterolateral perianal skin. Moderate distal rectal inflammatory changes, consistent with Crohn's disease.   No active drainage or infection      Labs reviewed: None  Radiology reviewed: None    Last colonoscopy: 2021, Giovani Hunter      Assessment/Plan:     A/P:  New problem(s) with uncertain prognosis: Anal fistula, Crohn's disease  Established problem(s): None  Additional workup/treatment planned: Continue expectant management for now, consider surgery if recurrent symptoms  Risk of complications/morbidity: Moderate    I discussed with Michell Sumit the pathophysiology, etiology, work-up, treatment options including surgical options regarding anal fistula in the setting of Crohn's disease. Fortunately, he is not really having any symptoms currently, but he does have intermittent drainage from the area. I do not feel strongly that we need to proceed with any urgent operation. I did discuss that if he does have recurrent symptoms down the road, we could drain any abscesses in the office or plan for EUA with seton placement. The seton will be a temporary placeholder to allow for drainage and allow for his [employer] to continue to work. He understood all of this and then would like to continue as we are doing. Continue with current medications    DISPOSITION:  cont GI f/u ; fu with me PRN    My findings will be relayed to consulting practitioner or PCP via Epic    Note completed using dictation software, please excuse any errors.     Electronically signed by Felicitas Chandler MD on 12/22/2022 at 8:52 AM

## 2024-12-13 ENCOUNTER — APPOINTMENT (OUTPATIENT)
Dept: ULTRASOUND IMAGING | Age: 74
DRG: 728 | End: 2024-12-13
Payer: MEDICARE

## 2024-12-13 ENCOUNTER — APPOINTMENT (OUTPATIENT)
Dept: CT IMAGING | Age: 74
DRG: 728 | End: 2024-12-13
Payer: MEDICARE

## 2024-12-13 ENCOUNTER — HOSPITAL ENCOUNTER (INPATIENT)
Age: 74
LOS: 1 days | Discharge: HOME | DRG: 728 | End: 2024-12-14
Attending: FAMILY MEDICINE | Admitting: FAMILY MEDICINE
Payer: MEDICARE

## 2024-12-13 DIAGNOSIS — N40.1 BENIGN PROSTATIC HYPERPLASIA WITH LOWER URINARY TRACT SYMPTOMS, SYMPTOM DETAILS UNSPECIFIED: ICD-10-CM

## 2024-12-13 DIAGNOSIS — N30.00 ACUTE CYSTITIS WITHOUT HEMATURIA: Primary | ICD-10-CM

## 2024-12-13 DIAGNOSIS — N45.1 EPIDIDYMITIS: ICD-10-CM

## 2024-12-13 DIAGNOSIS — N21.0 BLADDER STONES: ICD-10-CM

## 2024-12-13 DIAGNOSIS — D72.829 LEUKOCYTOSIS, UNSPECIFIED TYPE: ICD-10-CM

## 2024-12-13 LAB
ALBUMIN SERPL-MCNC: 3.6 G/DL (ref 3.4–5)
ALBUMIN/GLOB SERPL: 1 {RATIO} (ref 1.1–2.2)
ALP SERPL-CCNC: 92 U/L (ref 40–129)
ALT SERPL-CCNC: 17 U/L (ref 10–40)
ANION GAP SERPL CALCULATED.3IONS-SCNC: 11 MMOL/L (ref 3–16)
AST SERPL-CCNC: 22 U/L (ref 15–37)
BACTERIA URNS QL MICRO: ABNORMAL /HPF
BASOPHILS # BLD: 0.1 K/UL (ref 0–0.2)
BASOPHILS NFR BLD: 0.3 %
BILIRUB SERPL-MCNC: 0.7 MG/DL (ref 0–1)
BILIRUB UR QL STRIP.AUTO: NEGATIVE
BUN SERPL-MCNC: 14 MG/DL (ref 7–20)
CALCIUM SERPL-MCNC: 9.7 MG/DL (ref 8.3–10.6)
CHLORIDE SERPL-SCNC: 101 MMOL/L (ref 99–110)
CLARITY UR: ABNORMAL
CO2 SERPL-SCNC: 26 MMOL/L (ref 21–32)
COLOR UR: YELLOW
CREAT SERPL-MCNC: 1.2 MG/DL (ref 0.8–1.3)
DEPRECATED RDW RBC AUTO: 14.6 % (ref 12.4–15.4)
EOSINOPHIL # BLD: 0 K/UL (ref 0–0.6)
EOSINOPHIL NFR BLD: 0 %
EPI CELLS #/AREA URNS AUTO: 2 /HPF (ref 0–5)
GFR SERPLBLD CREATININE-BSD FMLA CKD-EPI: 64 ML/MIN/{1.73_M2}
GLUCOSE SERPL-MCNC: 158 MG/DL (ref 70–99)
GLUCOSE UR STRIP.AUTO-MCNC: NEGATIVE MG/DL
HCT VFR BLD AUTO: 44.1 % (ref 40.5–52.5)
HGB BLD-MCNC: 14.6 G/DL (ref 13.5–17.5)
HGB UR QL STRIP.AUTO: ABNORMAL
HYALINE CASTS #/AREA URNS AUTO: 4 /LPF (ref 0–8)
KETONES UR STRIP.AUTO-MCNC: 15 MG/DL
LACTATE BLDV-SCNC: 1.5 MMOL/L (ref 0.4–1.9)
LEUKOCYTE ESTERASE UR QL STRIP.AUTO: ABNORMAL
LYMPHOCYTES # BLD: 0.6 K/UL (ref 1–5.1)
LYMPHOCYTES NFR BLD: 2.3 %
MCH RBC QN AUTO: 30.1 PG (ref 26–34)
MCHC RBC AUTO-ENTMCNC: 33.1 G/DL (ref 31–36)
MCV RBC AUTO: 90.8 FL (ref 80–100)
MONOCYTES # BLD: 1.2 K/UL (ref 0–1.3)
MONOCYTES NFR BLD: 4.7 %
NEUTROPHILS # BLD: 22.7 K/UL (ref 1.7–7.7)
NEUTROPHILS NFR BLD: 92.7 %
NITRITE UR QL STRIP.AUTO: POSITIVE
PH UR STRIP.AUTO: 5.5 [PH] (ref 5–8)
PLATELET # BLD AUTO: 419 K/UL (ref 135–450)
PMV BLD AUTO: 8.5 FL (ref 5–10.5)
POTASSIUM SERPL-SCNC: 4.3 MMOL/L (ref 3.5–5.1)
PROT SERPL-MCNC: 7.2 G/DL (ref 6.4–8.2)
PROT UR STRIP.AUTO-MCNC: 30 MG/DL
RBC # BLD AUTO: 4.86 M/UL (ref 4.2–5.9)
RBC CLUMPS #/AREA URNS AUTO: 0 /HPF (ref 0–4)
SODIUM SERPL-SCNC: 138 MMOL/L (ref 136–145)
SP GR UR STRIP.AUTO: 1.02 (ref 1–1.03)
SPECIMEN TYPE: NORMAL
TRICHOMONAS VAGINALIS SCREEN: NEGATIVE
UA COMPLETE W REFLEX CULTURE PNL UR: YES
UA DIPSTICK W REFLEX MICRO PNL UR: YES
URN SPEC COLLECT METH UR: ABNORMAL
UROBILINOGEN UR STRIP-ACNC: 1 E.U./DL
WBC # BLD AUTO: 24.6 K/UL (ref 4–11)
WBC #/AREA URNS AUTO: 405 /HPF (ref 0–5)

## 2024-12-13 PROCEDURE — 87186 SC STD MICRODIL/AGAR DIL: CPT

## 2024-12-13 PROCEDURE — 96374 THER/PROPH/DIAG INJ IV PUSH: CPT

## 2024-12-13 PROCEDURE — 6360000002 HC RX W HCPCS: Performed by: FAMILY MEDICINE

## 2024-12-13 PROCEDURE — 85025 COMPLETE CBC W/AUTO DIFF WBC: CPT

## 2024-12-13 PROCEDURE — 6370000000 HC RX 637 (ALT 250 FOR IP): Performed by: FAMILY MEDICINE

## 2024-12-13 PROCEDURE — 87086 URINE CULTURE/COLONY COUNT: CPT

## 2024-12-13 PROCEDURE — 87040 BLOOD CULTURE FOR BACTERIA: CPT

## 2024-12-13 PROCEDURE — 76870 US EXAM SCROTUM: CPT

## 2024-12-13 PROCEDURE — 83605 ASSAY OF LACTIC ACID: CPT

## 2024-12-13 PROCEDURE — 87591 N.GONORRHOEAE DNA AMP PROB: CPT

## 2024-12-13 PROCEDURE — 99285 EMERGENCY DEPT VISIT HI MDM: CPT

## 2024-12-13 PROCEDURE — 93975 VASCULAR STUDY: CPT

## 2024-12-13 PROCEDURE — 80053 COMPREHEN METABOLIC PANEL: CPT

## 2024-12-13 PROCEDURE — 87077 CULTURE AEROBIC IDENTIFY: CPT

## 2024-12-13 PROCEDURE — 2580000003 HC RX 258: Performed by: FAMILY MEDICINE

## 2024-12-13 PROCEDURE — 74177 CT ABD & PELVIS W/CONTRAST: CPT

## 2024-12-13 PROCEDURE — 96375 TX/PRO/DX INJ NEW DRUG ADDON: CPT

## 2024-12-13 PROCEDURE — 36415 COLL VENOUS BLD VENIPUNCTURE: CPT

## 2024-12-13 PROCEDURE — 1200000000 HC SEMI PRIVATE

## 2024-12-13 PROCEDURE — 87808 TRICHOMONAS ASSAY W/OPTIC: CPT

## 2024-12-13 PROCEDURE — 6360000002 HC RX W HCPCS: Performed by: PHYSICIAN ASSISTANT

## 2024-12-13 PROCEDURE — 6360000004 HC RX CONTRAST MEDICATION: Performed by: PHYSICIAN ASSISTANT

## 2024-12-13 PROCEDURE — 87491 CHLMYD TRACH DNA AMP PROBE: CPT

## 2024-12-13 PROCEDURE — 81001 URINALYSIS AUTO W/SCOPE: CPT

## 2024-12-13 PROCEDURE — 94760 N-INVAS EAR/PLS OXIMETRY 1: CPT

## 2024-12-13 RX ORDER — POLYETHYLENE GLYCOL 3350 17 G/17G
17 POWDER, FOR SOLUTION ORAL DAILY PRN
Status: DISCONTINUED | OUTPATIENT
Start: 2024-12-13 | End: 2024-12-14 | Stop reason: HOSPADM

## 2024-12-13 RX ORDER — SODIUM CHLORIDE 0.9 % (FLUSH) 0.9 %
5-40 SYRINGE (ML) INJECTION EVERY 12 HOURS SCHEDULED
Status: DISCONTINUED | OUTPATIENT
Start: 2024-12-13 | End: 2024-12-14 | Stop reason: HOSPADM

## 2024-12-13 RX ORDER — ONDANSETRON 4 MG/1
4 TABLET, ORALLY DISINTEGRATING ORAL EVERY 8 HOURS PRN
Status: DISCONTINUED | OUTPATIENT
Start: 2024-12-13 | End: 2024-12-14 | Stop reason: HOSPADM

## 2024-12-13 RX ORDER — LEVOFLOXACIN 500 MG/1
500 TABLET, FILM COATED ORAL DAILY
Status: DISCONTINUED | OUTPATIENT
Start: 2024-12-13 | End: 2024-12-13 | Stop reason: DRUGHIGH

## 2024-12-13 RX ORDER — ACETAMINOPHEN 325 MG/1
650 TABLET ORAL EVERY 6 HOURS PRN
Status: DISCONTINUED | OUTPATIENT
Start: 2024-12-13 | End: 2024-12-14 | Stop reason: HOSPADM

## 2024-12-13 RX ORDER — ACETAMINOPHEN 650 MG/1
650 SUPPOSITORY RECTAL EVERY 6 HOURS PRN
Status: DISCONTINUED | OUTPATIENT
Start: 2024-12-13 | End: 2024-12-14 | Stop reason: HOSPADM

## 2024-12-13 RX ORDER — LEVOFLOXACIN 5 MG/ML
750 INJECTION, SOLUTION INTRAVENOUS ONCE
Status: COMPLETED | OUTPATIENT
Start: 2024-12-13 | End: 2024-12-13

## 2024-12-13 RX ORDER — ONDANSETRON 2 MG/ML
4 INJECTION INTRAMUSCULAR; INTRAVENOUS EVERY 6 HOURS PRN
Status: DISCONTINUED | OUTPATIENT
Start: 2024-12-13 | End: 2024-12-14 | Stop reason: HOSPADM

## 2024-12-13 RX ORDER — POTASSIUM CHLORIDE 7.45 MG/ML
10 INJECTION INTRAVENOUS PRN
Status: DISCONTINUED | OUTPATIENT
Start: 2024-12-13 | End: 2024-12-14 | Stop reason: HOSPADM

## 2024-12-13 RX ORDER — MAGNESIUM SULFATE IN WATER 40 MG/ML
2000 INJECTION, SOLUTION INTRAVENOUS PRN
Status: DISCONTINUED | OUTPATIENT
Start: 2024-12-13 | End: 2024-12-14 | Stop reason: HOSPADM

## 2024-12-13 RX ORDER — IOPAMIDOL 755 MG/ML
75 INJECTION, SOLUTION INTRAVASCULAR
Status: COMPLETED | OUTPATIENT
Start: 2024-12-13 | End: 2024-12-13

## 2024-12-13 RX ORDER — KETOROLAC TROMETHAMINE 15 MG/ML
15 INJECTION, SOLUTION INTRAMUSCULAR; INTRAVENOUS ONCE
Status: COMPLETED | OUTPATIENT
Start: 2024-12-13 | End: 2024-12-13

## 2024-12-13 RX ORDER — ONDANSETRON 2 MG/ML
4 INJECTION INTRAMUSCULAR; INTRAVENOUS ONCE
Status: COMPLETED | OUTPATIENT
Start: 2024-12-13 | End: 2024-12-13

## 2024-12-13 RX ORDER — SODIUM CHLORIDE 0.9 % (FLUSH) 0.9 %
5-40 SYRINGE (ML) INJECTION PRN
Status: DISCONTINUED | OUTPATIENT
Start: 2024-12-13 | End: 2024-12-14 | Stop reason: HOSPADM

## 2024-12-13 RX ORDER — M-VIT,TX,IRON,MINS/CALC/FOLIC 27MG-0.4MG
1 TABLET ORAL DAILY
COMMUNITY

## 2024-12-13 RX ORDER — POTASSIUM CHLORIDE 1500 MG/1
40 TABLET, EXTENDED RELEASE ORAL PRN
Status: DISCONTINUED | OUTPATIENT
Start: 2024-12-13 | End: 2024-12-14 | Stop reason: HOSPADM

## 2024-12-13 RX ORDER — DOXYCYCLINE HYCLATE 100 MG
100 TABLET ORAL EVERY 12 HOURS SCHEDULED
Status: DISCONTINUED | OUTPATIENT
Start: 2024-12-13 | End: 2024-12-14 | Stop reason: HOSPADM

## 2024-12-13 RX ORDER — ENOXAPARIN SODIUM 100 MG/ML
40 INJECTION SUBCUTANEOUS DAILY
Status: DISCONTINUED | OUTPATIENT
Start: 2024-12-13 | End: 2024-12-14 | Stop reason: HOSPADM

## 2024-12-13 RX ORDER — LEVOFLOXACIN 250 MG/1
250 TABLET, FILM COATED ORAL DAILY
Status: DISCONTINUED | OUTPATIENT
Start: 2024-12-14 | End: 2024-12-14 | Stop reason: HOSPADM

## 2024-12-13 RX ORDER — SODIUM CHLORIDE 9 MG/ML
INJECTION, SOLUTION INTRAVENOUS PRN
Status: DISCONTINUED | OUTPATIENT
Start: 2024-12-13 | End: 2024-12-14 | Stop reason: HOSPADM

## 2024-12-13 RX ADMIN — ONDANSETRON 4 MG: 2 INJECTION, SOLUTION INTRAMUSCULAR; INTRAVENOUS at 08:25

## 2024-12-13 RX ADMIN — ACETAMINOPHEN 650 MG: 325 TABLET ORAL at 20:53

## 2024-12-13 RX ADMIN — KETOROLAC TROMETHAMINE 15 MG: 15 INJECTION, SOLUTION INTRAMUSCULAR; INTRAVENOUS at 08:25

## 2024-12-13 RX ADMIN — SODIUM CHLORIDE, PRESERVATIVE FREE 10 ML: 5 INJECTION INTRAVENOUS at 20:54

## 2024-12-13 RX ADMIN — WATER 1000 MG: 1 INJECTION INTRAMUSCULAR; INTRAVENOUS; SUBCUTANEOUS at 13:24

## 2024-12-13 RX ADMIN — LEVOFLOXACIN 750 MG: 750 INJECTION, SOLUTION INTRAVENOUS at 10:21

## 2024-12-13 RX ADMIN — DOXYCYCLINE HYCLATE 100 MG: 100 TABLET, COATED ORAL at 20:54

## 2024-12-13 RX ADMIN — IOPAMIDOL 75 ML: 755 INJECTION, SOLUTION INTRAVENOUS at 09:28

## 2024-12-13 ASSESSMENT — PAIN DESCRIPTION - DESCRIPTORS
DESCRIPTORS: ACHING
DESCRIPTORS: THROBBING
DESCRIPTORS: THROBBING

## 2024-12-13 ASSESSMENT — PAIN DESCRIPTION - ORIENTATION
ORIENTATION: RIGHT

## 2024-12-13 ASSESSMENT — PAIN SCALES - GENERAL
PAINLEVEL_OUTOF10: 3
PAINLEVEL_OUTOF10: 5
PAINLEVEL_OUTOF10: 4
PAINLEVEL_OUTOF10: 4
PAINLEVEL_OUTOF10: 5

## 2024-12-13 ASSESSMENT — PAIN DESCRIPTION - LOCATION
LOCATION: GROIN

## 2024-12-13 ASSESSMENT — PAIN DESCRIPTION - ONSET: ONSET: GRADUAL

## 2024-12-13 ASSESSMENT — PAIN DESCRIPTION - PAIN TYPE
TYPE: ACUTE PAIN
TYPE: ACUTE PAIN

## 2024-12-13 ASSESSMENT — PAIN - FUNCTIONAL ASSESSMENT: PAIN_FUNCTIONAL_ASSESSMENT: PREVENTS OR INTERFERES SOME ACTIVE ACTIVITIES AND ADLS

## 2024-12-13 ASSESSMENT — LIFESTYLE VARIABLES
HOW OFTEN DO YOU HAVE A DRINK CONTAINING ALCOHOL: NEVER
HOW MANY STANDARD DRINKS CONTAINING ALCOHOL DO YOU HAVE ON A TYPICAL DAY: PATIENT DOES NOT DRINK

## 2024-12-13 NOTE — PROGRESS NOTES
Pharmacy Note - Renal Dosing    Levofloxacin  500 mg PO daily  for treatment of epididymitis . Per [facility] Renal Dose Adjustment Policy, levofloxacin will be changed to  250 mg PO daily (already received 750 mg IV today).    Estimated Creatinine Clearance: Estimated Creatinine Clearance: 46 mL/min (based on SCr of 1.2 mg/dL).    BMI: Body mass index is 18.47 kg/m².    Please call with any questions.    Thank you,    Kecia Lo [facility]

## 2024-12-13 NOTE — H&P
V2.0  History and Physical      Name:  Heraclio Temple /Age/Sex: 1950  (73 y.o. male)   MRN & CSN:  [id number] & [id number] Encounter Date/Time: 2024 12:18 PM EST   [place]  [facility] [place] [facility] [place] PCP: No primary care provider on file.       Hospital Day: 1    Assessment and Plan:   Heraclio Temple is a 73 y.o. male with a pmh of Crohn's disease who presents with Epididymitis    Hospital Problems             Last Modified POA    * (Principal) Epididymitis 2024 Yes       Plan:  Acute epididymitis  Testicular ultrasound showed good flow with the possibility of acute epididymitis  Patient was given 1 dose of Levaquin 750 mg IV in the ED  Will continue patient with Levaquin 500 milligrams daily for 10 days  Will continue patient with doxycycline 100 mg twice daily for 10 days  Will continue patient with Rocephin 1 g IV once  Will consult urology per ED recommendation    2.  Crohn's disease  Patient is currently on Skyrizi every 2 months  Will continue to follow monitor symptom    Advanced care planning was discussed with patient for a total of 16 minutes.  Full code, DNR CC, DNR CCA, power of  and living will were discussed. Patient elected to be a full code  Disposition:   Current Living situation: Home  Expected Disposition: Home  Estimated D/C: 1-2 days    Diet No diet orders on file   DVT Prophylaxis [x] Lovenox, []  Heparin, [] SCDs, [x] Ambulation,  [] Eliquis, [] Xarelto, [] Coumadin   Code Status Full code   Surrogate Decision Maker/ POA Self     Personally reviewed Lab Studies and Imaging         History from:     patient, spouse, ED physician, medical record    History of Present Illness:     Chief Complaint: Right testicular pain and swelling  Heraclio Temple is a 73 y.o. male with pmh of Crohn's disease who presents with right testicular pain and swelling  Patient started having symptoms of right testicular pain and swelling for the last few days.  Patient presented to the ED, had an  Multiplanar reformatted images are provided for review. Automated exposure control, iterative reconstruction, and/or weight based adjustment of the mA/kV was utilized to reduce the radiation dose to as low as reasonably achievable. COMPARISON: 2021 HISTORY: ORDERING SYSTEM PROVIDED HISTORY: right sided groin/testicle swelling and pain TECHNOLOGIST PROVIDED HISTORY: Reason for exam:->right sided groin/testicle swelling and pain Additional Contrast?->None Decision Support Exception - unselect if not a suspected or confirmed emergency medical condition->Emergency Medical Condition (MA) FINDINGS: Lower Chest: Atelectasis is seen at the lung bases.  Small hiatal hernia seen.  There is nonspecific thickening at the GE junction Adrenals: Unremarkable Kidneys: Lobular contour to the left kidney is seen..  No hydronephrosis. There is lobular contour the right kidney.  No hydronephrosis noted.  Small cyst seen in right kidney 5 mm calcifications seen in right kidney. Spleen: No splenomegaly.  No perisplenic fluid Liver and Gallbladder: Gallstones are seen.  No developing biliary ductal dilatation noted.  Punctate hypodense nodule seen anteriorly in left hepatic lobe.  Small hypodense nodule seen inferiorly in the right hepatic lobe, similar to prior Pancreas: No pancreatic calcification.  No peripancreatic fluid. GI: Scattered fluid-filled loops of small bowel are seen.  No bowel obstruction.  Appendix identified and normal in caliber.  Scattered colonic diverticula are seen.  Rectosigmoid colon is contracted, accentuating its wall thickness..  Prominent submucosal fat seen throughout the bowel, similar to prior. There is a focal area of small-bowel wall thickening with surrounding edema in the mesentery, measuring approximately 4.5 cm in the right lower quadrant. Aorta and retroperitoneum:Atherosclerotic change seen in abdominal aorta.  No aneurysm.  Small retroperitoneal nodes are seen, similar to prior. Pelvis: Prostate is

## 2024-12-13 NOTE — PROGRESS NOTES
Medication Reconciliation    List of medications patient is currently taking is complete.     Source of information: 1. Conversation with patient at bedside                                      2. EPIC records      Notes regarding home medications:   1. Patient reports next dose of Skyrizi is supposed to be 12/26/24      Albin Benitez [facility]   12/13/2024  11:41 AM

## 2024-12-13 NOTE — ED PROVIDER NOTES
EMERGENCY DEPARTMENT ENCOUNTER        Pt Name: Heraclio Temple  MRN: [id number]  Birthdate 1950  Date of evaluation: 12/13/2024  Provider: Isis Sanches PA-C  PCP: No primary care provider on file.  Note Started: 7:25 AM EST 12/13/24      NICKI. I have evaluated this patient.        CHIEF COMPLAINT       Chief Complaint   Patient presents with    Groin Swelling     Pt states yesterday he noticed his right testicle was swollen, he iced it and the swelling improved after ice, but this morning he states the swelling has returned. Pt also c/o back pain and abdominal pain since the testicle swelling started.        HISTORY OF PRESENT ILLNESS: 1 or more Elements     History From: patient    Heraclio Temple is a 73 y.o. male who presents for right-sided testicular pain and swelling that started yesterday when he woke up around 5:30 AM.  Pain has been constant and is rated 4 out of 10.  He denies any associated urinary symptoms.  There is no dysuria, hematuria or abnormal frequency.  Denies prior episodes.  He does have some suprapubic abdominal pain also.  He had some nausea with dry heaving this morning.  Denies vomiting.  Denies any history of abdominal surgery.  Does have a history of rectal cancer that was removed transrectally.  Did not require chemo or radiation.  He does have a history of Crohn's disease on Skyrizi.  GI doctor is Dr. Pace.  He denies any fever.  Denies diarrhea.  Denies chest pain shortness of breath    Nursing Notes were reviewed and agreed with or any disagreements were addressed in the HPI.    REVIEW OF SYSTEMS :      Review of Systems    Positives and Pertinent negatives as per HPI.     SURGICAL HISTORY   History reviewed. No pertinent surgical history.    CURRENTMEDICATIONS       Current Discharge Medication List        CONTINUE these medications which have NOT CHANGED    Details   Risankizumab-rzaa (SKYRIZI SC) Inject into the skin every 2 months      Multiple  sepsis, UTI, epididymitis with medication, reevaluation, chart review, consultation            Chronic Conditions: Crohn's disease      I am the Primary Clinician of Record.    Is this patient to be included in the SEP-1 Core Measure due to severe sepsis or septic shock?   No   Exclusion criteria - the patient is NOT to be included for SEP-1 Core Measure due to:  May have criteria for sepsis, but does not meet criteria for severe sepsis or septic shock    PROCEDURES   Unless otherwise noted below, none     Procedures    FINAL IMPRESSION      1. Acute cystitis without hematuria    2. Epididymitis    3. Leukocytosis, unspecified type    4. Bladder stones    5. Benign prostatic hyperplasia with lower urinary tract symptoms, symptom details unspecified          DISPOSITION/PLAN       DISPOSITION Admitted 12/13/2024 11:24:34 AM               PATIENT REFERRED TO:  No follow-up provider specified.    DISCHARGE MEDICATIONS:  Current Discharge Medication List          DISCONTINUED MEDICATIONS:  Current Discharge Medication List        STOP taking these medications       inFLIXimab (INFLECTRA) 100 MG SOLR Comments:   Reason for Stopping:         famotidine (PEPCID) 20 MG tablet Comments:   Reason for Stopping:                      (Please note that portions of this note were completed with a voice recognition program.  Efforts were made to edit the dictations but occasionally words are mis-transcribed.)    Isis Sanches PA-C (electronically signed)            Isis Sanches PA-C  12/13/24 [id number]

## 2024-12-13 NOTE — ED NOTES
Pt transported to [facility] via stretcher. Cardiac monitoring continued during transport.

## 2024-12-13 NOTE — ED TRIAGE NOTES
Pt arrived via private car to the ED for Groin Swelling. Pt states yesterday he noticed his right testicle was swollen, he iced it and the swelling improved after ice, but this morning he states the swelling has returned. Pt also c/o back pain and abdominal pain since the testicle swelling started. Pt A&Ox4, VSS. Pt wife at the bedside during triage.

## 2024-12-13 NOTE — PROGRESS NOTES
Pt arrived to floor from ED.  PT AAOX4, UAL, VSS.  Pt ambulated with steady gait to bed.  Orders released.  Admission questions and assessment completed at this time.  Pt states no needs.

## 2024-12-13 NOTE — CARE COORDINATION
Quick chart review revealed that patient is from home, with wife, has insurance, PCP- Wilfredo Pace, and ambulates with steady gait. Likely another couple days.     Discharge Planning:      (CM) reviewed the patient's chart to assess needs. Patient's Readmission Risk Score is   . Patient's medical insurance is  Payor: [employer] MEDICARE / Plan: [employer] CHOICE-PPO MEDICARE / Product Type: *No Product type* / .  Patient's PCP is No primary care provider on file. .  No needs anticipated, at this time. CM team to follow. Staff to inform CM if additional discharge needs arise.    Pts preferred pharmacy is   [id number] - [place], [place] - [address] - P [phone] - F [phone]  [address] [place] [address]  Phone: [phone] Fax: [phone]       Pepito Malin LMSW, [facility] Social Work Case Management   Phone: [phone]  Fax: [phone]

## 2024-12-13 NOTE — PROGRESS NOTES
4 Eyes Skin Assessment     NAME:  Heraclio Temple  YOB: 1950  MEDICAL RECORD NUMBER:  [id number]    The patient is being assessed for  Admission    I agree that at least one RN has performed a thorough Head to Toe Skin Assessment on the patient. ALL assessment sites listed below have been assessed.      Areas assessed by both nurses:    Head, Face, Ears, Shoulders, Back, Chest, Arms, Elbows, Hands, Sacrum. Buttock, Coccyx, Ischium, and Legs. Feet and Heels        Does the Patient have a Wound? No noted wound(s)       Luis Prevention initiated by RN: No  Wound Care Orders initiated by RN: No    Pressure Injury (Stage 3,4, Unstageable, DTI, NWPT, and Complex wounds) if present, place Wound referral order by RN under : No    New Ostomies, if present place, Ostomy referral order under : No     Nurse 1 eSignature: Electronically signed by Genoveva Cruz RN on 12/13/24 at 12:34 PM EST    **SHARE this note so that the co-signing nurse can place an eSignature**    Nurse 2 eSignature: Electronically signed by Patricia Loyola RN on 12/13/24 at 12:54 PM EST

## 2024-12-14 VITALS
DIASTOLIC BLOOD PRESSURE: 72 MMHG | BODY MASS INDEX: 18.43 KG/M2 | TEMPERATURE: 98.1 F | RESPIRATION RATE: 18 BRPM | HEART RATE: 69 BPM | WEIGHT: 128.75 LBS | HEIGHT: 70 IN | OXYGEN SATURATION: 98 % | SYSTOLIC BLOOD PRESSURE: 125 MMHG

## 2024-12-14 LAB
BACTERIA BLD CULT ORG #2: NORMAL
BACTERIA BLD CULT: NORMAL
DEPRECATED RDW RBC AUTO: 14.5 % (ref 12.4–15.4)
HCT VFR BLD AUTO: 44.1 % (ref 40.5–52.5)
HGB BLD-MCNC: 14.6 G/DL (ref 13.5–17.5)
MCH RBC QN AUTO: 30 PG (ref 26–34)
MCHC RBC AUTO-ENTMCNC: 33.1 G/DL (ref 31–36)
MCV RBC AUTO: 90.9 FL (ref 80–100)
PLATELET # BLD AUTO: 432 K/UL (ref 135–450)
PMV BLD AUTO: 8.9 FL (ref 5–10.5)
RBC # BLD AUTO: 4.85 M/UL (ref 4.2–5.9)
WBC # BLD AUTO: 20 K/UL (ref 4–11)

## 2024-12-14 PROCEDURE — 85027 COMPLETE CBC AUTOMATED: CPT

## 2024-12-14 PROCEDURE — 36415 COLL VENOUS BLD VENIPUNCTURE: CPT

## 2024-12-14 PROCEDURE — 6370000000 HC RX 637 (ALT 250 FOR IP): Performed by: FAMILY MEDICINE

## 2024-12-14 RX ORDER — DOXYCYCLINE HYCLATE 100 MG
100 TABLET ORAL EVERY 12 HOURS SCHEDULED
Qty: 18 TABLET | Refills: 0 | Status: SHIPPED | OUTPATIENT
Start: 2024-12-14 | End: 2024-12-23

## 2024-12-14 RX ORDER — LEVOFLOXACIN 250 MG/1
500 TABLET, FILM COATED ORAL DAILY
Qty: 18 TABLET | Refills: 0 | Status: SHIPPED | OUTPATIENT
Start: 2024-12-14 | End: 2024-12-23

## 2024-12-14 RX ADMIN — LEVOFLOXACIN 250 MG: 250 TABLET, FILM COATED ORAL at 11:46

## 2024-12-14 RX ADMIN — DOXYCYCLINE HYCLATE 100 MG: 100 TABLET, COATED ORAL at 07:32

## 2024-12-14 RX ADMIN — ACETAMINOPHEN 650 MG: 325 TABLET ORAL at 07:32

## 2024-12-14 ASSESSMENT — PAIN SCALES - GENERAL
PAINLEVEL_OUTOF10: 0
PAINLEVEL_OUTOF10: 3

## 2024-12-14 ASSESSMENT — PAIN DESCRIPTION - DESCRIPTORS: DESCRIPTORS: ACHING

## 2024-12-14 ASSESSMENT — PAIN DESCRIPTION - LOCATION: LOCATION: GROIN

## 2024-12-14 NOTE — PLAN OF CARE
Problem: Chronic Conditions and Co-morbidities  Goal: Patient's chronic conditions and co-morbidity symptoms are monitored and maintained or improved  12/14/2024 0744 by Hilaria Hamilton RN  Outcome: Progressing  12/14/2024 0428 by Estefanía Mckay RN  Outcome: Progressing     Problem: Discharge Planning  Goal: Discharge to home or other facility with appropriate resources  12/14/2024 0744 by Hilaria Hamilton RN  Outcome: Progressing  12/14/2024 0428 by Estefanía Mckay RN  Outcome: Progressing     Problem: Pain  Goal: Verbalizes/displays adequate comfort level or baseline comfort level  12/14/2024 0744 by Hilaria Hamilton RN  Outcome: Progressing  12/14/2024 0428 by Estefanía Mckay RN  Outcome: Progressing

## 2024-12-14 NOTE — DISCHARGE SUMMARY
Hospital Medicine Discharge Summary    Patient ID: Heraclio Temple      Patient's PCP: No primary care provider on file.    Admit Date: 12/13/2024     Discharge Date:   12/14/2024    Admitting Provider: Charline Carrillo MD     Discharge Provider: Charline Carrillo MD     Discharge Diagnoses:       Active Hospital Problems    Diagnosis     Epididymitis [N45.1]        The patient was seen and examined on day of discharge and this discharge summary is in conjunction with any daily progress note from day of discharge.    Hospital Course:     Heraclio Temple is a 73 y.o. male with a pmh of Crohn's disease who presents with Epididymitis most likely due to UTI.  Patient had an ultrasound of the scrotum which showed good blood flow to the testes but showed signs of acute epididymitis  Patient was started on Levaquin, 1 dose of Rocephin, doxycycline  Patient was evaluated by urology they recommended that patient continues on the Levaquin and the doxycycline for a total of 10 days.  Patient this morning felt much improved and would like to be discharged back to home.  Wife is at the bedside and in agreement with the plan    Patient is to follow-up with urology early next week for urine culture results  Patient is follow-up with urology in 2 to 3 weeks  Patient is follow-up with PCP in 3 to 5 days or sooner if needed          Physical Exam Performed:     /72   Pulse 69   Temp 98.1 °F (36.7 °C) (Oral)   Resp 18   Ht 1.778 m (5' 10\")   Wt 58.4 kg (128 lb 12 oz)   SpO2 98%   BMI 18.47 kg/m²       General: NAD  Eyes: EOMI  ENT: neck supple  Cardiovascular: Regular rate.  Respiratory: Clear to auscultation  Gastrointestinal: Soft, non tender  Genitourinary: Right testicular tenderness with erythema, mobile  Musculoskeletal: No edema  Skin: warm, dry  Neuro: Alert.  Psych: Mood appropriate.          Labs: For convenience and continuity at follow-up the following most recent labs are provided:      CBC:    Lab Results

## 2024-12-14 NOTE — PLAN OF CARE
Problem: Chronic Conditions and Co-morbidities  Goal: Patient's chronic conditions and co-morbidity symptoms are monitored and maintained or improved  12/14/2024 1151 by Hilaria Hamilton RN  Outcome: Completed  12/14/2024 0744 by Hilaria Hamilton RN  Outcome: Progressing  12/14/2024 0428 by Estefanía Mckay RN  Outcome: Progressing     Problem: Discharge Planning  Goal: Discharge to home or other facility with appropriate resources  12/14/2024 1151 by Hilaria Hamilton RN  Outcome: Completed  12/14/2024 0744 by Hilaria Hamilton RN  Outcome: Progressing  12/14/2024 0428 by Estefanía Mckay RN  Outcome: Progressing     Problem: Pain  Goal: Verbalizes/displays adequate comfort level or baseline comfort level  12/14/2024 1151 by Hilaria Hamilton RN  Outcome: Completed  12/14/2024 0744 by Hilaria Hamilton RN  Outcome: Progressing  12/14/2024 0428 by Estefanía Mckay RN  Outcome: Progressing

## 2024-12-14 NOTE — CONSULTS
10\")   Wt 58.4 kg (128 lb 12 oz)   SpO2 98%   BMI 18.47 kg/m²     Intake/Output Summary (Last 24 hours) at 12/14/2024 1024  Last data filed at 12/14/2024 0909  Gross per 24 hour   Intake 1294.67 ml   Output --   Net 1294.67 ml       Physical Exam:  General Appearance: Alert and oriented, cooperative, no distress, appears stated age  Head: Normocephalic, without obvious abnormality, atraumatic  Back: no CVA tenderness  Lungs: respirations unlabored, no wheezing  Heart: Regular rate and rhythm, no lower extremity edema noted  Abdomen: Soft, non-tender, non-distended, no masses  Skin: Skin color, texture, turgor normal, no rashes or lesions  Neurologic: no gross deficits   Male :  Nonpalpable bladder  No CVA tenderness    Penis appears normal and  circumcised  Urethral meatus is normal in size and location  Right epididymis is swollen and inflamed and indurated, tenderness to palpation.  Left testes normal      Labs:  CBC   Lab Results   Component Value Date/Time    WBC 24.6 12/13/2024 08:16 AM    RBC 4.86 12/13/2024 08:16 AM    HGB 14.6 12/13/2024 08:16 AM    HCT 44.1 12/13/2024 08:16 AM    MCV 90.8 12/13/2024 08:16 AM    MCH 30.1 12/13/2024 08:16 AM    MCHC 33.1 12/13/2024 08:16 AM    RDW 14.6 12/13/2024 08:16 AM     12/13/2024 08:16 AM    MPV 8.5 12/13/2024 08:16 AM     BMP   Lab Results   Component Value Date/Time     12/13/2024 08:16 AM    K 4.3 12/13/2024 08:16 AM     12/13/2024 08:16 AM    CO2 26 12/13/2024 08:16 AM    BUN 14 12/13/2024 08:16 AM    CREATININE 1.2 12/13/2024 08:16 AM    GLUCOSE 158 12/13/2024 08:16 AM    CALCIUM 9.7 12/13/2024 08:16 AM       Urinalysis:   Lab Results   Component Value Date/Time    COLORU Yellow 12/13/2024 08:16 AM    GLUCOSEU Negative 12/13/2024 08:16 AM    BLOODU TRACE 12/13/2024 08:16 AM    NITRU POSITIVE 12/13/2024 08:16 AM    LEUKOCYTESUR LARGE 12/13/2024 08:16 AM       Imaging: Scrotal ultrasound and CT personally reviewed   pertinent images and  radiologist's report were reviewed independently      Impression/Plan:   Right-sided epididymitis.  Secondary to UTI.  UTI secondary to significant bladder stone burden and BPH    Recommend antibiotics, patient would like to be discharged today.  I would recheck CBC today.  If his leukocytosis is improving he can be discharged on doxycycline 100 mg twice daily for 10 days and levofloxacin 500 mg daily for 10 days.    I encouraged him to call the office early next week to ensure he is on culture specific antibiotics.    He should follow-up in the office in 2 to 3 weeks to recheck and discuss further management.  He will need outpatient cystoscopy and prostate ultrasound to delineate anatomy and likely will require robotic bladder stone removal, potentially with concurrent bladder outlet obstruction procedure.    Urology will sign off, call with questions or concerns        Contreras Love MD 12/14/202410:24 AM

## 2024-12-15 LAB
BACTERIA UR CULT: ABNORMAL
ORGANISM: ABNORMAL

## 2024-12-16 LAB
C TRACH DNA UR QL NAA+PROBE: NEGATIVE
N GONORRHOEA DNA UR QL NAA+PROBE: NEGATIVE

## 2024-12-17 LAB
BACTERIA BLD CULT ORG #2: NORMAL
BACTERIA BLD CULT: NORMAL

## 2025-06-03 LAB
25(OH)D3 SERPL-MCNC: 27.9 NG/ML
ALBUMIN SERPL-MCNC: 4.4 G/DL (ref 3.4–5)
ALBUMIN/GLOB SERPL: 1.7 {RATIO} (ref 1.1–2.2)
ALP SERPL-CCNC: 78 U/L (ref 40–129)
ALT SERPL-CCNC: 19 U/L (ref 10–40)
ANION GAP SERPL CALCULATED.3IONS-SCNC: 13 MMOL/L (ref 3–16)
AST SERPL-CCNC: 22 U/L (ref 15–37)
BASOPHILS # BLD: 0 K/UL (ref 0–0.2)
BASOPHILS NFR BLD: 0.3 %
BILIRUB SERPL-MCNC: 0.6 MG/DL (ref 0–1)
BUN SERPL-MCNC: 13 MG/DL (ref 7–20)
CALCIUM SERPL-MCNC: 9.9 MG/DL (ref 8.3–10.6)
CHLORIDE SERPL-SCNC: 103 MMOL/L (ref 99–110)
CO2 SERPL-SCNC: 23 MMOL/L (ref 21–32)
CREAT SERPL-MCNC: 1.1 MG/DL (ref 0.8–1.3)
CRP SERPL-MCNC: 6.5 MG/L (ref 0–5.1)
DEPRECATED RDW RBC AUTO: 14.4 % (ref 12.4–15.4)
EOSINOPHIL # BLD: 0.2 K/UL (ref 0–0.6)
EOSINOPHIL NFR BLD: 2.3 %
FOLATE SERPL-MCNC: 30.7 NG/ML (ref 4.78–24.2)
GFR SERPLBLD CREATININE-BSD FMLA CKD-EPI: 71 ML/MIN/{1.73_M2}
GLUCOSE SERPL-MCNC: 143 MG/DL (ref 70–99)
HBV SURFACE AB SERPL IA-ACNC: <3.5 MIU/ML
HBV SURFACE AG SERPL QL IA: NORMAL
HCT VFR BLD AUTO: 44.2 % (ref 40.5–52.5)
HGB BLD-MCNC: 15.3 G/DL (ref 13.5–17.5)
LYMPHOCYTES # BLD: 1.3 K/UL (ref 1–5.1)
LYMPHOCYTES NFR BLD: 14.4 %
MCH RBC QN AUTO: 30.7 PG (ref 26–34)
MCHC RBC AUTO-ENTMCNC: 34.6 G/DL (ref 31–36)
MCV RBC AUTO: 88.8 FL (ref 80–100)
MONOCYTES # BLD: 0.6 K/UL (ref 0–1.3)
MONOCYTES NFR BLD: 7.4 %
NEUTROPHILS # BLD: 6.6 K/UL (ref 1.7–7.7)
NEUTROPHILS NFR BLD: 75.6 %
PLATELET # BLD AUTO: 302 K/UL (ref 135–450)
PMV BLD AUTO: 9.3 FL (ref 5–10.5)
POTASSIUM SERPL-SCNC: 4 MMOL/L (ref 3.5–5.1)
PROT SERPL-MCNC: 7 G/DL (ref 6.4–8.2)
RBC # BLD AUTO: 4.98 M/UL (ref 4.2–5.9)
SODIUM SERPL-SCNC: 139 MMOL/L (ref 136–145)
VIT B12 SERPL-MCNC: 633 PG/ML (ref 211–911)
WBC # BLD AUTO: 8.7 K/UL (ref 4–11)

## 2025-06-04 LAB
QUANTI TB1 MINUS NIL: 0.01 IU/ML
QUANTI TB2 MINUS NIL: 0.01 IU/ML
QUANTIFERON MITOGEN MINUS NIL: 9.92 IU/ML
QUANTIFERON NIL: 0.08 IU/ML
QUANTIFERON TB INTERPRETATION: NEGATIVE IU/ML